# Patient Record
Sex: FEMALE | Race: WHITE | NOT HISPANIC OR LATINO | Employment: FULL TIME | URBAN - NONMETROPOLITAN AREA
[De-identification: names, ages, dates, MRNs, and addresses within clinical notes are randomized per-mention and may not be internally consistent; named-entity substitution may affect disease eponyms.]

---

## 2017-03-20 ENCOUNTER — PROCEDURE VISIT (OUTPATIENT)
Dept: OBSTETRICS AND GYNECOLOGY | Facility: CLINIC | Age: 37
End: 2017-03-20

## 2017-03-20 ENCOUNTER — LAB (OUTPATIENT)
Dept: LAB | Facility: HOSPITAL | Age: 37
End: 2017-03-20

## 2017-03-20 VITALS
HEIGHT: 71 IN | DIASTOLIC BLOOD PRESSURE: 88 MMHG | BODY MASS INDEX: 40.6 KG/M2 | SYSTOLIC BLOOD PRESSURE: 120 MMHG | WEIGHT: 290 LBS

## 2017-03-20 DIAGNOSIS — E66.01 OBESITY, CLASS III, BMI 40-49.9 (MORBID OBESITY) (HCC): ICD-10-CM

## 2017-03-20 DIAGNOSIS — E88.81 INSULIN RESISTANCE: ICD-10-CM

## 2017-03-20 DIAGNOSIS — Z01.419 VISIT FOR GYNECOLOGIC EXAMINATION: Primary | ICD-10-CM

## 2017-03-20 DIAGNOSIS — R32 URINARY INCONTINENCE, UNSPECIFIED TYPE: ICD-10-CM

## 2017-03-20 PROBLEM — E88.819 INSULIN RESISTANCE: Status: ACTIVE | Noted: 2017-03-20

## 2017-03-20 LAB — TSH SERPL DL<=0.05 MIU/L-ACNC: 3.14 MIU/ML (ref 0.46–4.68)

## 2017-03-20 PROCEDURE — 99395 PREV VISIT EST AGE 18-39: CPT | Performed by: OBSTETRICS & GYNECOLOGY

## 2017-03-20 PROCEDURE — 87086 URINE CULTURE/COLONY COUNT: CPT | Performed by: OBSTETRICS & GYNECOLOGY

## 2017-03-20 PROCEDURE — 84443 ASSAY THYROID STIM HORMONE: CPT | Performed by: OBSTETRICS & GYNECOLOGY

## 2017-03-20 PROCEDURE — 36415 COLL VENOUS BLD VENIPUNCTURE: CPT

## 2017-03-20 NOTE — PROGRESS NOTES
Subjective   Janell Suazo is a 37 y.o. female.     HPI Comments: Patient presents today for annual gynecologic exam  Last Pap smear was in October 2015 and was normal.  Patient reports some urinary incontinence, most often noted when bending/changing position.  Patient also reports difficulty with sleeping.  Patient reports some nodularity in her underarm area especially on the right.  We discussed weight loss and some potential benefits in regards to sleeping and urinary concerns.  We discussed some potential dietary modification including avoiding problematic foods and portion size.  We discussed exercise and its importance for weight loss.  Patient was notified by another provider that she has insulin resistance for which she was started on metformin.  Patient stopped taking metformin because this caused GI upset and diarrhea.      Gynecologic Exam   The patient's primary symptoms include pelvic pain (occasional intermittent). Associated symptoms include urgency (sometimes loses a little urine). Pertinent negatives include no constipation.       The following portions of the patient's history were reviewed and updated as appropriate: allergies, current medications, past family history, past medical history, past social history, past surgical history and problem list.      Review of Systems   Constitutional: Negative for activity change, appetite change and unexpected weight change.   HENT: Negative for congestion and dental problem.    Eyes: Negative for discharge and itching.   Respiratory: Negative for shortness of breath and wheezing.    Cardiovascular: Negative for chest pain and palpitations.   Gastrointestinal: Negative for abdominal distention and constipation.   Endocrine: Negative for cold intolerance and heat intolerance.   Genitourinary: Positive for pelvic pain (occasional intermittent) and urgency (sometimes loses a little urine). Negative for menstrual problem.   Musculoskeletal: Negative for  myalgias and neck pain.   Allergic/Immunologic: Negative for environmental allergies and food allergies.   Neurological: Negative for dizziness and facial asymmetry.   Hematological: Negative for adenopathy. Does not bruise/bleed easily.   Psychiatric/Behavioral: Negative for agitation and behavioral problems.   All other systems reviewed and are negative.      Objective   Physical Exam   Constitutional: She is oriented to person, place, and time. She appears well-developed and well-nourished. No distress.   HENT:   Head: Normocephalic and atraumatic.   Right Ear: External ear normal.   Left Ear: External ear normal.   Nose: Nose normal.   Mouth/Throat: Oropharynx is clear and moist. No oropharyngeal exudate.   Eyes: Conjunctivae and EOM are normal. Pupils are equal, round, and reactive to light. Right eye exhibits no discharge. Left eye exhibits no discharge. No scleral icterus.   Neck: Normal range of motion. Neck supple. No tracheal deviation present. No thyromegaly present.   Cardiovascular: Normal rate, regular rhythm, normal heart sounds and intact distal pulses.  Exam reveals no gallop and no friction rub.    No murmur heard.  Pulmonary/Chest: Effort normal and breath sounds normal. No respiratory distress. She has no wheezes. She has no rales. She exhibits no mass, no tenderness, no laceration, no deformity and no retraction. Right breast exhibits no inverted nipple, no mass, no nipple discharge, no skin change and no tenderness. Left breast exhibits no inverted nipple, no mass, no nipple discharge, no skin change and no tenderness. Breasts are symmetrical. There is no breast swelling.   Abdominal: Soft. She exhibits no distension and no mass. There is no tenderness. There is no rebound and no guarding. No hernia.   Genitourinary: Vagina normal and uterus normal. No breast tenderness, discharge or bleeding. Pelvic exam was performed with patient supine. No labial fusion. There is no rash, tenderness, lesion  or injury on the right labia. There is no rash, tenderness, lesion or injury on the left labia. Uterus is not deviated, not enlarged, not fixed and not tender. Cervix exhibits no motion tenderness, no discharge and no friability. Right adnexum displays no mass, no tenderness and no fullness. Left adnexum displays no mass, no tenderness and no fullness. No erythema, tenderness or bleeding in the vagina. No foreign body in the vagina. No signs of injury around the vagina. No vaginal discharge found.   Musculoskeletal: Normal range of motion. She exhibits no edema or deformity.   Neurological: She is alert and oriented to person, place, and time. No cranial nerve deficit. Coordination normal.   Skin: Skin is warm and dry. No rash noted. She is not diaphoretic. No erythema. No pallor.   Psychiatric: She has a normal mood and affect. Her behavior is normal. Judgment and thought content normal.   Vitals reviewed.      Assessment/Plan   Janell was seen today for gynecologic exam.    Diagnoses and all orders for this visit:    Visit for gynecologic examination  -     Urine Culture    Insulin resistance  -     Hemoglobin A1c; Future    Obesity, Class III, BMI 40-49.9 (morbid obesity)  -     TSH; Future    Urinary incontinence, unspecified type  -     Ambulatory Referral to Physical Therapy Evaluate and treat (Loss of urine sometimes with position changes.)       Weight loss  Dietary modification  Check TSH, A1C  PT for urinary incontinence and check UCx

## 2017-03-21 ENCOUNTER — TRANSCRIBE ORDERS (OUTPATIENT)
Dept: PHYSICAL THERAPY | Facility: CLINIC | Age: 37
End: 2017-03-21

## 2017-03-21 DIAGNOSIS — N39.498 OTHER URINARY INCONTINENCE: Primary | ICD-10-CM

## 2017-03-21 LAB — BACTERIA SPEC AEROBE CULT: NORMAL

## 2017-03-24 ENCOUNTER — APPOINTMENT (OUTPATIENT)
Dept: LAB | Facility: HOSPITAL | Age: 37
End: 2017-03-24

## 2017-03-24 ENCOUNTER — TELEPHONE (OUTPATIENT)
Dept: OBSTETRICS AND GYNECOLOGY | Facility: CLINIC | Age: 37
End: 2017-03-24

## 2017-03-24 LAB — HBA1C MFR BLD: 5.55 % (ref 4–5.6)

## 2017-03-24 PROCEDURE — 36415 COLL VENOUS BLD VENIPUNCTURE: CPT

## 2017-03-24 PROCEDURE — 83036 HEMOGLOBIN GLYCOSYLATED A1C: CPT | Performed by: OBSTETRICS & GYNECOLOGY

## 2017-03-24 NOTE — TELEPHONE ENCOUNTER
----- Message from JAN Holt sent at 3/24/2017  8:52 AM CDT -----  No UTI    I called this patient with these results.  She ask about her other results.  I talked to corrine about her A1C.  He is calling her about this.

## 2017-09-12 ENCOUNTER — TELEPHONE (OUTPATIENT)
Dept: FAMILY MEDICINE CLINIC | Facility: CLINIC | Age: 37
End: 2017-09-12

## 2017-09-12 NOTE — TELEPHONE ENCOUNTER
PT CALLED STATING SAW IN URGENT CARE, THINKS UTI IS BACK WOULD LIKE TO SPEAK WITH YOU. PT IS NOT ESTABLISHED    Needs to be seen for this. Thanks

## 2018-02-27 ENCOUNTER — OFFICE VISIT (OUTPATIENT)
Dept: INTERNAL MEDICINE | Facility: CLINIC | Age: 38
End: 2018-02-27

## 2018-02-27 VITALS
DIASTOLIC BLOOD PRESSURE: 80 MMHG | WEIGHT: 285.8 LBS | BODY MASS INDEX: 40.01 KG/M2 | HEIGHT: 71 IN | SYSTOLIC BLOOD PRESSURE: 130 MMHG

## 2018-02-27 DIAGNOSIS — J01.00 ACUTE MAXILLARY SINUSITIS, RECURRENCE NOT SPECIFIED: Primary | ICD-10-CM

## 2018-02-27 DIAGNOSIS — E88.81 INSULIN RESISTANCE: ICD-10-CM

## 2018-02-27 DIAGNOSIS — E66.9 OBESITY, CLASS II, BMI 35-39.9: ICD-10-CM

## 2018-02-27 DIAGNOSIS — G89.29 CHRONIC MIDLINE LOW BACK PAIN WITH RIGHT-SIDED SCIATICA: ICD-10-CM

## 2018-02-27 DIAGNOSIS — M54.41 CHRONIC MIDLINE LOW BACK PAIN WITH RIGHT-SIDED SCIATICA: ICD-10-CM

## 2018-02-27 PROCEDURE — 99204 OFFICE O/P NEW MOD 45 MIN: CPT | Performed by: INTERNAL MEDICINE

## 2018-02-27 RX ORDER — AMOXICILLIN AND CLAVULANATE POTASSIUM 875; 125 MG/1; MG/1
1 TABLET, FILM COATED ORAL EVERY 12 HOURS SCHEDULED
Qty: 14 TABLET | Refills: 0 | Status: SHIPPED | OUTPATIENT
Start: 2018-02-27 | End: 2018-03-09

## 2018-02-27 RX ORDER — FLUTICASONE PROPIONATE 50 MCG
2 SPRAY, SUSPENSION (ML) NASAL DAILY
Qty: 1 BOTTLE | Refills: 0 | Status: SHIPPED | OUTPATIENT
Start: 2018-02-27 | End: 2018-05-01

## 2018-02-27 RX ORDER — ALBUTEROL SULFATE 90 UG/1
2 AEROSOL, METERED RESPIRATORY (INHALATION) EVERY 4 HOURS PRN
Qty: 1 INHALER | Refills: 0 | Status: SHIPPED | OUTPATIENT
Start: 2018-02-27 | End: 2018-03-09

## 2018-02-27 NOTE — PROGRESS NOTES
Subjective   Janell Suazo is a 38 y.o. female with PMH of Insulin resistance, back pain who comes to establish care.    Patient today complains of sinus congestion, nasal congestion, earache and cough.Cough is productive of yellowish sputum and is worse at night. She has been sick for several weeks.    Patient reports that she was diagnosed with Insulin resistance couple of years ago. She was briefly on Metformin but stopped due to diarrhea.  Has been having difficulty losing weight.    She also complains of recurrent episodes of low back pain. Pain is located in lower lumbar area and radiates down her right leg. She fell few years ago and hurt her back and has been having problems with her back on and off since then.  X-Ray of L spine showed degenerative disc changes.        Past Medical History:   Diagnosis Date   • Acute otitis media with effusion    • Allergic rhinitis    • Amenorrhea    • Anxiety state    • Diarrhea    • Encounter for gynecological examination    • Encounter for gynecological examination without abnormal finding    • Epigastric pain     Burning epigastric pain    • Fatigue    • Gastroesophageal reflux disease    • Hip pain    • Knee pain    • Laboratory examination ordered as part of a routine general medical examination in pediatric patient     General examination of patient    • Low back pain    • Lumbosacral strain    • Metabolic syndrome X    • Morbid obesity     BMI 40.4    • Nausea and vomiting    • Obesity     Hyperinsulinar obesity    • Otitis media    • Pain in limb     right limb   • Rhinitis      Past Surgical History:   Procedure Laterality Date   • DIAGNOSTIC LAPAROSCOPY  06/21/2007    Exam under anesthesia, diagnostic laparoscopy, diagnostic hysteroscopy, fractional dilatation and curetttage   • INJECTION OF MEDICATION  11/05/2012    Kenalog (1)  -  SILVESTRE Valdes   • PAP SMEAR  08/08/2011    Negative   • SALPINGO OOPHORECTOMY  2004    Left   [facility]   • TUBAL ABDOMINAL  LIGATION  2010    [place], [place]       Social History   Substance Use Topics   • Smoking status: Never Smoker   • Smokeless tobacco: Never Used   • Alcohol use No     Family History   Problem Relation Age of Onset   • Colon cancer Other      Colorectal cancer   • Diabetes Other    • Hypertension Other    • Thyroid disease Other      No Known Allergies  Current Outpatient Prescriptions on File Prior to Visit   Medication Sig Dispense Refill   • [DISCONTINUED] cefuroxime (CEFTIN) 500 MG tablet Take 1 tablet by mouth 2 (Two) Times a Day. 20 tablet 0   • [DISCONTINUED] cyclobenzaprine (FLEXERIL) 10 MG tablet Take 10 mg by mouth 3 (Three) Times a Day As Needed for Muscle Spasms.     • [DISCONTINUED] cyclobenzaprine (FLEXERIL) 5 MG tablet Take 1 tablet by mouth 3 (Three) Times a Day As Needed for Muscle Spasms. 20 tablet 0   • [DISCONTINUED] diclofenac (VOLTAREN) 50 MG EC tablet Take 1 tablet by mouth 3 (Three) Times a Day. 30 tablet 0   • [DISCONTINUED] levoFLOXacin (LEVAQUIN) 750 MG tablet Take 1 tablet by mouth Daily. 7 tablet 0   • [DISCONTINUED] naproxen (EC NAPROSYN) 500 MG EC tablet Take 1 tablet by mouth 2 (Two) Times a Day As Needed for Mild Pain . 30 tablet 0   • [DISCONTINUED] promethazine-dextromethorphan (PROMETHAZINE-DM) 6.25-15 MG/5ML syrup Take 5 mL by mouth 4 (Four) Times a Day As Needed for Cough. 118 mL 0     No current facility-administered medications on file prior to visit.      Review of Systems   Constitutional: Positive for chills. Negative for activity change.   HENT: Positive for congestion, ear pain, rhinorrhea, sinus pain and sinus pressure.    Respiratory: Positive for cough and wheezing.    Cardiovascular: Negative for chest pain, palpitations and leg swelling.   Endocrine: Negative for cold intolerance, heat intolerance, polydipsia, polyphagia and polyuria.   Musculoskeletal: Positive for back pain. Negative for gait problem and neck pain.   Neurological: Negative for dizziness, syncope and  light-headedness.   Psychiatric/Behavioral: Negative for sleep disturbance. The patient is not nervous/anxious.        Objective   Physical Exam   Constitutional: She is oriented to person, place, and time. She appears well-developed and well-nourished.   HENT:   Head: Normocephalic and atraumatic.   Right Ear: Tympanic membrane normal.   Left Ear: A middle ear effusion is present.   Nose: Mucosal edema present. Right sinus exhibits maxillary sinus tenderness. Left sinus exhibits maxillary sinus tenderness.   Mouth/Throat: Posterior oropharyngeal erythema present.   Eyes: Conjunctivae and EOM are normal. Pupils are equal, round, and reactive to light.   Neck: No JVD present. No thyromegaly present.   Cardiovascular: Normal rate and regular rhythm.    Pulmonary/Chest: Effort normal. She has wheezes.   Abdominal: Soft. Bowel sounds are normal.   Musculoskeletal: Normal range of motion. She exhibits no deformity.        Lumbar back: She exhibits tenderness. She exhibits no swelling and no deformity.   Neurological: She is alert and oriented to person, place, and time. She has normal reflexes.   Nursing note and vitals reviewed.          Patient Active Problem List   Diagnosis   • Insulin resistance   • Obesity, Class III, BMI 40-49.9 (morbid obesity)               Assessment/Plan   Janell was seen today for establish care, uri, cough, earache and sinusitis.    Diagnoses and all orders for this visit:    Acute maxillary sinusitis, recurrence not specified    Insulin resistance  -     Hemoglobin A1c  -     Insulin, Total  -     Comprehensive Metabolic Panel  -     Ambulatory Referral to Diabetic Education    Obesity, Class II, BMI 35-39.9    Chronic midline low back pain with right-sided sciatica  -     Ambulatory Referral to Physical Therapy Evaluate and treat    Other orders  -     amoxicillin-clavulanate (AUGMENTIN) 875-125 MG per tablet; Take 1 tablet by mouth Every 12 (Twelve) Hours.  -     fluticasone (FLONASE)  50 MCG/ACT nasal spray; 2 sprays into each nostril Daily.  -     albuterol (PROVENTIL HFA;VENTOLIN HFA) 108 (90 Base) MCG/ACT inhaler; Inhale 2 puffs Every 4 (Four) Hours As Needed for Wheezing.  -     metFORMIN (GLUCOPHAGE) 500 MG tablet; Take 1 tablet by mouth 2 (Two) Times a Day With Meals.             Plan      1. Augmentin 875 mg bid for 1 week.      Flonase nasal spray 2 puffs daily.      Zyrtec OTC 10 mg daily.      Albuterol inhaler 2 puffs qid PRN.      Robitussin DM PRN cough.  2. Will recheck HgbA1c, Insulin level and lipids.      Calories restricted diet and weight loss.      Dietary modifications.      Will try Metformin 500 mg daily and advance dose as tolerated.      If GI side effects again, will change to Metformin XR.  3. Referral to PT.      Naproxen 500 mg bid on PRN basis.             Follow up in 1 month                    This document has been electronically signed by Sumi Trevizo MD on February 27, 2018 10:56 PM

## 2018-03-05 DIAGNOSIS — H92.02 EARACHE, LEFT: Primary | ICD-10-CM

## 2018-03-06 ENCOUNTER — TELEPHONE (OUTPATIENT)
Dept: INTERNAL MEDICINE | Facility: CLINIC | Age: 38
End: 2018-03-06

## 2018-03-09 ENCOUNTER — TELEPHONE (OUTPATIENT)
Dept: INTERNAL MEDICINE | Facility: CLINIC | Age: 38
End: 2018-03-09

## 2018-03-09 ENCOUNTER — OFFICE VISIT (OUTPATIENT)
Dept: OTOLARYNGOLOGY | Facility: CLINIC | Age: 38
End: 2018-03-09

## 2018-03-09 ENCOUNTER — CLINICAL SUPPORT (OUTPATIENT)
Dept: AUDIOLOGY | Facility: CLINIC | Age: 38
End: 2018-03-09

## 2018-03-09 VITALS — TEMPERATURE: 98.1 F | WEIGHT: 284 LBS | HEIGHT: 71 IN | BODY MASS INDEX: 39.76 KG/M2

## 2018-03-09 DIAGNOSIS — H93.12 TINNITUS OF LEFT EAR: ICD-10-CM

## 2018-03-09 DIAGNOSIS — H69.82 DYSFUNCTION OF LEFT EUSTACHIAN TUBE: ICD-10-CM

## 2018-03-09 DIAGNOSIS — J32.8 OTHER CHRONIC SINUSITIS: Primary | ICD-10-CM

## 2018-03-09 DIAGNOSIS — H90.12 CONDUCTIVE HEARING LOSS OF LEFT EAR WITH UNRESTRICTED HEARING OF RIGHT EAR: Primary | ICD-10-CM

## 2018-03-09 DIAGNOSIS — H90.72 MIXED CONDUCTIVE AND SENSORINEURAL HEARING LOSS OF LEFT EAR WITH UNRESTRICTED HEARING OF RIGHT EAR: ICD-10-CM

## 2018-03-09 PROCEDURE — 99204 OFFICE O/P NEW MOD 45 MIN: CPT | Performed by: OTOLARYNGOLOGY

## 2018-03-09 PROCEDURE — 92567 TYMPANOMETRY: CPT | Performed by: AUDIOLOGIST

## 2018-03-09 PROCEDURE — 92557 COMPREHENSIVE HEARING TEST: CPT | Performed by: AUDIOLOGIST

## 2018-03-09 RX ORDER — PREDNISONE 10 MG/1
TABLET ORAL
Qty: 30 TABLET | Refills: 0 | Status: SHIPPED | OUTPATIENT
Start: 2018-03-09 | End: 2018-04-06

## 2018-03-09 RX ORDER — CEFDINIR 300 MG/1
300 CAPSULE ORAL 2 TIMES DAILY
Qty: 28 CAPSULE | Refills: 0 | Status: SHIPPED | OUTPATIENT
Start: 2018-03-09 | End: 2018-03-20

## 2018-03-09 NOTE — TELEPHONE ENCOUNTER
Spoke with pt let her know she has an appt on 3/9/2018 at 3:15 at the [facility] and she needs to keep this appt .....

## 2018-03-09 NOTE — PROGRESS NOTES
Subjective   Janell Suazo is a 38 y.o. female.   CC: tinnitus and ear discomfort  History of Present Illness    Patient comes in with several weeks of discomfort with tinnitus which is sometimes pulsatile in her left ear to bothers her thinks are for sleep at night hard to hear whether for same.  She's had associated congestion rhinitis and sinusitis.  She is on some Augmentin Flonase 1 today but is not cleared.  She's not any fever chills.  She's had long-standing severe allergies to multiple findings.  She is drainage with rhinitis and postnasal drip and congestion    Says she's not a diabetic and has a metabolic syndrome.  The following portions of the patient's history were reviewed and updated as appropriate: allergies, current medications, past family history, past medical history, past social history, past surgical history and problem list.      Janell Suazo reports that she has never smoked. She has never used smokeless tobacco. She reports that she does not drink alcohol or use illicit drugs.  Patient is not a tobacco user and has not been counseled for use of tobacco products    Family History   Problem Relation Age of Onset   • Colon cancer Other      Colorectal cancer   • Diabetes Other    • Hypertension Other    • Thyroid disease Other          Current Outpatient Prescriptions:   •  fluticasone (FLONASE) 50 MCG/ACT nasal spray, 2 sprays into each nostril Daily., Disp: 1 bottle, Rfl: 0  •  metFORMIN (GLUCOPHAGE) 500 MG tablet, Take 1 tablet by mouth 2 (Two) Times a Day With Meals., Disp: 60 tablet, Rfl: 2  •  cefdinir (OMNICEF) 300 MG capsule, Take 1 capsule by mouth 2 (Two) Times a Day., Disp: 28 capsule, Rfl: 0  •  predniSONE (DELTASONE) 10 MG tablet, Daily dose: 5 tabs for 2 days, then 4 tabs for 2 days, then 3 tabs for 2 days, then 2 tabs for 2 days, then 1 tab for 2 days, Disp: 30 tablet, Rfl: 0    No Known Allergies    Past Medical History:   Diagnosis Date   • Acute otitis media with  effusion    • Allergic rhinitis    • Amenorrhea    • Anxiety state    • Diarrhea    • Encounter for gynecological examination    • Encounter for gynecological examination without abnormal finding    • Epigastric pain     Burning epigastric pain    • Fatigue    • Gastroesophageal reflux disease    • Hip pain    • Knee pain    • Laboratory examination ordered as part of a routine general medical examination in pediatric patient     General examination of patient    • Low back pain    • Lumbosacral strain    • Metabolic syndrome X    • Morbid obesity     BMI 40.4    • Nausea and vomiting    • Obesity     Hyperinsulinar obesity    • Otitis media    • Pain in limb     right limb   • Rhinitis          Review of Systems   Constitutional: Negative for fever.   HENT: Positive for congestion, hearing loss, sinus pressure and tinnitus. Negative for facial swelling and nosebleeds.    Hematological: Negative for adenopathy.   All other systems reviewed and are negative.          Objective   Physical Exam   Constitutional: She is oriented to person, place, and time. She appears well-developed and well-nourished.   HENT:   Head: Normocephalic and atraumatic.   Right Ear: Hearing, tympanic membrane, external ear and ear canal normal. No mastoid tenderness.   Left Ear: External ear and ear canal normal. No mastoid tenderness. Tympanic membrane is not injected, not scarred and not erythematous. A middle ear effusion is present.   Nose: Mucosal edema, rhinorrhea, nasal deformity and septal deviation present. No epistaxis. Right sinus exhibits no maxillary sinus tenderness and no frontal sinus tenderness. Left sinus exhibits no maxillary sinus tenderness and no frontal sinus tenderness.   Mouth/Throat: Uvula is midline, oropharynx is clear and moist and mucous membranes are normal. No trismus in the jaw. Normal dentition. No oropharyngeal exudate or posterior oropharyngeal edema. No tonsillar exudate.   Eyes: Conjunctivae and EOM are  normal.   Neck: Normal range of motion. Neck supple. No JVD present. No tracheal deviation present. No thyromegaly present.   Cardiovascular: Normal rate and regular rhythm.    Pulmonary/Chest: Effort normal.   Musculoskeletal: Normal range of motion.   Lymphadenopathy:        Head (right side): No submental, no submandibular, no tonsillar, no preauricular, no posterior auricular and no occipital adenopathy present.        Head (left side): No submental, no submandibular, no tonsillar, no preauricular, no posterior auricular and no occipital adenopathy present.     She has no cervical adenopathy.        Right cervical: No superficial cervical, no deep cervical and no posterior cervical adenopathy present.       Left cervical: No superficial cervical, no deep cervical and no posterior cervical adenopathy present.   Neurological: She is alert and oriented to person, place, and time. No cranial nerve deficit.   Skin: Skin is warm.   Psychiatric: She has a normal mood and affect. Her speech is normal and behavior is normal. Thought content normal.   Nursing note and vitals reviewed.        The audiogram and tympanogram reviewed the patient actual findings discussed showing mixed hearing loss as early conductive component and flat tympanogram the left    Assessment/Plan   Janell was seen today for earache.    Diagnoses and all orders for this visit:    Other chronic sinusitis    Tinnitus of left ear    Dysfunction of left eustachian tube    Mixed conductive and sensorineural hearing loss of left ear with unrestricted hearing of right ear    Other orders  -     predniSONE (DELTASONE) 10 MG tablet; Daily dose: 5 tabs for 2 days, then 4 tabs for 2 days, then 3 tabs for 2 days, then 2 tabs for 2 days, then 1 tab for 2 days  -     cefdinir (OMNICEF) 300 MG capsule; Take 1 capsule by mouth 2 (Two) Times a Day.        Discussed options of medical therapy.    The above medications or risks side effects and usage to minimize  side effects .  discussed possibly myringotomy nasopharyngoscopy CT scan sinuses temporal bone based on her findings.   I think is reasonable treat her conservatively the am not sure all of her hearing loss is just from the fluid repeat hearing testing on follow-up consider other options Scearce season detail and she is agreement this.   will call me changes or problems or questions or any difficulty with medications

## 2018-03-12 NOTE — PROGRESS NOTES
"STANDARD AUDIOMETRIC EVALUATION      Name:  Janell Suazo  :  1980  Age:  38 y.o.  Date of Evaluation: 3/9/2018    HISTORY    Reason for visit:  Janell Suazo is seen today for a hearing evaluation at the request of Dr. Dex Cunha.  Patient reports tinnitus and a \"whooshing\" sound in her left ear. She reports a dull ache in her left ear.  She reports that her ears feel like they are under water.  She reports having a left ear infection and has taken 2 sets of antibiotics.      EVALUATION    See Audiogram    RESULTS        Otoscopy and Tympanometry 226 Hz :  Right Ear:  Otoscopy:  Clear ear canal          Tympanometry:  Middle ear function within normal limits    Left Ear:   Otoscopy:  Clear ear canal        Tympanometry:  Reduced pressure and compliance consistent with outer/middle ear involvement    Test technique:  Standard Audiometry     Pure Tone Audiometry:   Patient responded to pure tones at 10-25 dB for 250-8000 Hz in right ear, and at 20-70 dB for 250-8000 Hz in left ear.       Speech Audiometry:        Right Ear:  Speech Reception Threshold (SRT) was obtained at 10 dBHL                 Speech Discrimination scores were 100% in quiet when words were presented at 50 dBHL       Left Ear:  Speech Reception Threshold (SRT) was obtained at 25 dBHL                 Speech Discrimination scores were 96% in masking noise when words were presented at  60 dBHL    Reliability:   good    IMPRESSIONS:  1.  Tympanometry results are consistent with Middle ear function within normal limits in right ear, and Reduced pressure and compliance consistent with outer/middle ear involvement in left ear.  2.  Pure tone results are consistent with hearing sensitivity within normal limits for right ear, and within normal limits to moderately-severe reverse cookie bite conductive hearing loss in left ear.       RECOMMENDATIONS:  Patient is seeing the Ear Nose and Throat physician immediately following this " examination.  It was a pleasure seeing Janell Suazo in Audiology today.  We would be happy to do further testing or discuss these test as necessary.          This document has been electronically signed by CARMINA Freedman on March 12, 2018 8:02 AM          CARMINA Freedman  Licensed Audiologist

## 2018-03-13 ENCOUNTER — TELEPHONE (OUTPATIENT)
Dept: SURGERY | Facility: CLINIC | Age: 38
End: 2018-03-13

## 2018-03-13 NOTE — TELEPHONE ENCOUNTER
Patient states the medicine is not helping and she is unsure if it is her body causing the bleeding or if its the medicine.  Advised he that Dr. Cunha is in surgery and if someone does not contact her later this evening we will call her in the morning.  She understood.

## 2018-03-13 NOTE — TELEPHONE ENCOUNTER
Per Dr. Cunha, patient was notified to stop taking the prednisone and continue the antibiotic, also she would need to see a GYN for the bleeding.  She stated she has an appointment on Monday 3/19/18 with her GYN doctor.

## 2018-03-13 NOTE — TELEPHONE ENCOUNTER
----- Message from Rupal Spear sent at 3/13/2018 11:23 AM CDT -----  Contact: [phone]  HAVING PROBLEMS WITH MEDICATION. HAVING VAGINAL BLEEDING AND DISCHARGER. THINKS IT IS FROM THE MEDICATION CEFDINIR 300MG PREDNISONE 10MG. EAR IS NOT FEELING BETTER.

## 2018-03-13 NOTE — TELEPHONE ENCOUNTER
----- Message from Rupal Spear sent at 3/13/2018 11:23 AM CDT -----  Contact: [phone]  HAVING PROBLEMS WITH MEDICATION. HAVING VAGINAL BLEEDING AND DISCHARGER. THINKS IT IS FROM THE MEDICATION CEFDINIR 300MG PREDNISONE 10MG. EAR IS NOT FEELING BETTER.

## 2018-03-15 ENCOUNTER — TELEPHONE (OUTPATIENT)
Dept: OBSTETRICS AND GYNECOLOGY | Facility: CLINIC | Age: 38
End: 2018-03-15

## 2018-03-15 NOTE — TELEPHONE ENCOUNTER
----- Message from Nisreen Liu CNA sent at 3/15/2018 12:58 PM CDT -----  This patient has called multiple times this week. She is complaining of pelvic pain. She has canceled multiple appointments and now is demanding that she needs to be seen ASAP for pelvic pain. She currently has an appointment for the 26th with karlene. She wants to be seen before then. I told her if it was that bad that maybe she should go to the ER and she states she cant afford it. I told her I didn't see anything soon to get her in with [facility]. IF you see a good spot, or want me to work her in somewhere please let me know .  Thanks     I called this patient and informed her that we gave her the appointment on 3-26-18 with Dr. Billings.  That is as soon as we can get her in.  She has had several appointments in this department and she didn't show up for them.  I also told her that if she was hurting too bad or bleeding heavy that she needs to go to the ER.

## 2018-03-20 ENCOUNTER — OFFICE VISIT (OUTPATIENT)
Dept: OTOLARYNGOLOGY | Facility: CLINIC | Age: 38
End: 2018-03-20

## 2018-03-20 ENCOUNTER — CLINICAL SUPPORT (OUTPATIENT)
Dept: AUDIOLOGY | Facility: CLINIC | Age: 38
End: 2018-03-20

## 2018-03-20 VITALS — TEMPERATURE: 97.7 F | HEIGHT: 71 IN | WEIGHT: 284 LBS | BODY MASS INDEX: 39.76 KG/M2

## 2018-03-20 DIAGNOSIS — H93.12 TINNITUS OF LEFT EAR: Primary | ICD-10-CM

## 2018-03-20 DIAGNOSIS — H90.12 CONDUCTIVE HEARING LOSS OF LEFT EAR WITH UNRESTRICTED HEARING OF RIGHT EAR: Primary | ICD-10-CM

## 2018-03-20 DIAGNOSIS — H69.82 DYSFUNCTION OF LEFT EUSTACHIAN TUBE: ICD-10-CM

## 2018-03-20 DIAGNOSIS — H90.72 MIXED CONDUCTIVE AND SENSORINEURAL HEARING LOSS OF LEFT EAR WITH UNRESTRICTED HEARING OF RIGHT EAR: ICD-10-CM

## 2018-03-20 PROCEDURE — 92557 COMPREHENSIVE HEARING TEST: CPT | Performed by: AUDIOLOGIST

## 2018-03-20 PROCEDURE — 92567 TYMPANOMETRY: CPT | Performed by: AUDIOLOGIST

## 2018-03-20 PROCEDURE — 69420 INCISION OF EARDRUM: CPT | Performed by: OTOLARYNGOLOGY

## 2018-03-20 PROCEDURE — 99213 OFFICE O/P EST LOW 20 MIN: CPT | Performed by: OTOLARYNGOLOGY

## 2018-03-20 NOTE — PROGRESS NOTES
Subjective   Janell Suazo is a 38 y.o. female.   CC:Patientwith persistent hearing loss    History of Present Illness   Patient says that hearing loss and tinnitus is very bothersome makes her heart for sleep is not having a lot of pain in her ear just congestion she was unable take the antibiotics and all the steroids because some other issues.  She was told by her GYN not to do that.  She currently denies any fever no drainage out of her ear but has hearing loss and tinnitus particularly in left ear [email or web address] a work      The following portions of the patient's history were reviewed and updated as appropriate: allergies, current medications, past family history, past medical history, past social history, past surgical history and problem list.      Current Outpatient Prescriptions:   •  fluticasone (FLONASE) 50 MCG/ACT nasal spray, 2 sprays into each nostril Daily., Disp: 1 bottle, Rfl: 0  •  metFORMIN (GLUCOPHAGE) 500 MG tablet, Take 1 tablet by mouth 2 (Two) Times a Day With Meals., Disp: 60 tablet, Rfl: 2  •  predniSONE (DELTASONE) 10 MG tablet, Daily dose: 5 tabs for 2 days, then 4 tabs for 2 days, then 3 tabs for 2 days, then 2 tabs for 2 days, then 1 tab for 2 days, Disp: 30 tablet, Rfl: 0    No Known Allergies          Review of Systems   Constitutional: Negative for fever.   HENT: Positive for congestion, hearing loss, postnasal drip, sinus pressure and tinnitus. Negative for nosebleeds.    Hematological: Negative for adenopathy.   All other systems reviewed and are negative.          Objective   Physical Exam   Constitutional: She is oriented to person, place, and time. She appears well-developed and well-nourished.   HENT:   Head: Normocephalic and atraumatic.   Right Ear: Hearing, tympanic membrane, external ear and ear canal normal. No mastoid tenderness.   Left Ear: External ear and ear canal normal. No mastoid tenderness. Tympanic membrane is not injected, not scarred and not erythematous.  A middle ear effusion is present.   Nose: Mucosal edema, rhinorrhea, nasal deformity and septal deviation present. No epistaxis. Right sinus exhibits no maxillary sinus tenderness and no frontal sinus tenderness. Left sinus exhibits no maxillary sinus tenderness and no frontal sinus tenderness.   Mouth/Throat: Uvula is midline, oropharynx is clear and moist and mucous membranes are normal. No trismus in the jaw. Normal dentition. No oropharyngeal exudate or posterior oropharyngeal edema. No tonsillar exudate.   Eyes: Conjunctivae and EOM are normal.   Neck: Normal range of motion. Neck supple. No JVD present. No tracheal deviation present. No thyromegaly present.   Cardiovascular: Normal rate and regular rhythm.    Pulmonary/Chest: Effort normal.   Musculoskeletal: Normal range of motion.   Lymphadenopathy:        Head (right side): No submental, no submandibular, no tonsillar, no preauricular, no posterior auricular and no occipital adenopathy present.        Head (left side): No submental, no submandibular, no tonsillar, no preauricular, no posterior auricular and no occipital adenopathy present.     She has no cervical adenopathy.        Right cervical: No superficial cervical, no deep cervical and no posterior cervical adenopathy present.       Left cervical: No superficial cervical, no deep cervical and no posterior cervical adenopathy present.   Neurological: She is alert and oriented to person, place, and time. No cranial nerve deficit.   Skin: Skin is warm.   Psychiatric: She has a normal mood and affect. Her speech is normal and behavior is normal. Thought content normal.   Nursing note and vitals reviewed.  The audiogram was reviewed with the patient showing worsening of her conductive hearing loss with a mixed component flat tympanogram on the left    Procedure note: Left myringotomy was done after getting consent after discussed the patient's options.  Particular since she could not tolerate the  medications she was desperate have suggested she consider myringotomy she understood that couldn't not heal she could need other surgery including PE tube placement.  We also discussed on a different day doing and nasopharyngoscopy and she was too uncomfortable to do that today.  After getting consent left ear was anesthetized with topical EMLA cream and small amount of phenol anterior inferior radial incision was made.  Dissection symptoms medically improved.    Assessment/Plan   Janell was seen today for tinnitus and sinus problem.    Diagnoses and all orders for this visit:    Tinnitus of left ear    Dysfunction of left eustachian tube    Mixed conductive and sensorineural hearing loss of left ear with unrestricted hearing of right ear        Is to keep the ear dry until also drainage persists more than 3 or 4 days on the left ear.  We'll see her back in 2 weeks tip is healing we may need to consider PE tube also consider nasopharyngoscopy all questions were answered she is happy that she is doing better and she is to call for questions or problems

## 2018-03-21 NOTE — PROGRESS NOTES
STANDARD AUDIOMETRIC EVALUATION      Name:  Janell Suazo  :  1980  Age:  38 y.o.  Date of Evaluation: 3/20/2018    HISTORY    Reason for visit:  Janell Suazo is seen today for a hearing evaluation at the request of Dr. Dex Cunha.  Patient reports that she is unsure as to how she is hearing.  She reports having left sided tinnitus and dull achy pain.  She repots that her ears feel like they are under water.      EVALUATION    See Audiogram    RESULTS        Otoscopy and Tympanometry 226 Hz :  Right Ear:  Otoscopy:  Clear ear canal          Tympanometry:  Middle ear function within normal limits    Left Ear:   Otoscopy:  Clear ear canal        Tympanometry:  Reduced pressure and compliance consistent with outer/middle ear involvement    Test technique:  Standard Audiometry     Pure Tone Audiometry:   Patient responded to pure tones at 10-25 dB for 250-8000 Hz in right ear, and at 55-85 dB for 250-8000 Hz in left ear.       Speech Audiometry:        Right Ear:  Speech Reception Threshold (SRT) was obtained at 10 dBHL                 Speech Discrimination scores were 100% in quiet when words were presented at 50 dBHL       Left Ear:  Speech Reception Threshold (SRT) was obtained at 55 dBHL                 Speech Discrimination scores were 96% in masking noise when words were presented at  85 dBHL    Reliability:   good    IMPRESSIONS:  1.  Tympanometry results are consistent with Middle ear function within normal limits in right ear, and Reduced pressure and compliance consistent with outer/middle ear involvement in left ear.  2.  Pure tone results are consistent with hearing sensitivity within normal limits for right ear, and moderate to severe sloping conductive hearing loss  in left ear.       RECOMMENDATIONS:  Patient is seeing the Ear Nose and Throat physician immediately following this examination.  It was a pleasure seeing Janell Suazo in Audiology today.  We would be happy to do  further testing or discuss these test as necessary.          This document has been electronically signed by CARMINA Freedman on March 21, 2018 9:05 AM          CARMINA Freedman  Licensed Audiologist

## 2018-03-22 DIAGNOSIS — E88.81 INSULIN RESISTANCE: ICD-10-CM

## 2018-03-22 DIAGNOSIS — E66.9 OBESITY, UNSPECIFIED CLASSIFICATION, UNSPECIFIED OBESITY TYPE, UNSPECIFIED WHETHER SERIOUS COMORBIDITY PRESENT: Primary | ICD-10-CM

## 2018-04-06 ENCOUNTER — CLINICAL SUPPORT (OUTPATIENT)
Dept: AUDIOLOGY | Facility: CLINIC | Age: 38
End: 2018-04-06

## 2018-04-06 ENCOUNTER — OFFICE VISIT (OUTPATIENT)
Dept: OTOLARYNGOLOGY | Facility: CLINIC | Age: 38
End: 2018-04-06

## 2018-04-06 VITALS — BODY MASS INDEX: 39.06 KG/M2 | TEMPERATURE: 96.9 F | WEIGHT: 279 LBS | HEIGHT: 71 IN

## 2018-04-06 DIAGNOSIS — H90.72 MIXED CONDUCTIVE AND SENSORINEURAL HEARING LOSS OF LEFT EAR WITH UNRESTRICTED HEARING OF RIGHT EAR: ICD-10-CM

## 2018-04-06 DIAGNOSIS — H93.12 TINNITUS OF LEFT EAR: Primary | ICD-10-CM

## 2018-04-06 DIAGNOSIS — H69.82 EUSTACHIAN TUBE DYSFUNCTION, LEFT: Primary | ICD-10-CM

## 2018-04-06 DIAGNOSIS — H69.82 DYSFUNCTION OF LEFT EUSTACHIAN TUBE: ICD-10-CM

## 2018-04-06 PROCEDURE — 92567 TYMPANOMETRY: CPT | Performed by: AUDIOLOGIST

## 2018-04-06 PROCEDURE — 69433 CREATE EARDRUM OPENING: CPT | Performed by: OTOLARYNGOLOGY

## 2018-04-06 RX ORDER — OFLOXACIN 3 MG/ML
4 SOLUTION AURICULAR (OTIC) 2 TIMES DAILY
Qty: 10 ML | Refills: 0 | Status: SHIPPED | OUTPATIENT
Start: 2018-04-06 | End: 2018-05-01

## 2018-04-06 NOTE — PATIENT INSTRUCTIONS

## 2018-04-06 NOTE — PROGRESS NOTES
OPERATIVE NOTE    Name:    Janell Suazo  YOB: 1980  Date of surgery:       Pre-op Diagnosis:   Otitis media with mixed hearing loss  Post-op Diagnosis:   Same  Procedure: Left myringotomy and PE tube placement local anesthesia      Surgeon:  Dex Cunha MD, AAOHNS    Anesthesia:  Topical phenol    Staff:   LEAH Tapia     Estimated Blood Loss:  1 ml    Specimens:                 none      Drains:  none    Findings:  Mucoid OM    Complications: None    IMPLANTS:   PE tube resendiz time     INDICATIONS: Failed medical therapy and failed myringotomy with recurrent otitis media with effusion    PROCEDURE: After getting consent for timeout was carried out the ear was anesthetized with local anesthetic.  Dilute phenol the anteromedial quadrant incision made thick glue-like fluid suctioned and Resendiz PE tube placed patient tolerated it well without endoscopic dictation she was given antibiotic eardrops and to keep her eardrum will follow-up in 3 weeks            This document has been electronically signed by Dex Cunha MD on April 6, 2018 12:51 PM

## 2018-04-06 NOTE — PROGRESS NOTES
TYMPANOMETRY ONLY      Name:  Janell Suazo  :  1980  Age:  38 y.o.  Date of Evaluation:  2018      HISTORY    Reason for visit:  Janell Suazo is seen today for a tympanogram at the request of Dr. Dex Cunha.  Patient reports that the left ear feels like it closed back up.      RESULTS        Otoscopy and Tympanometry 226 Hz :  Right Ear:  Otoscopy:  Clear ear canal          Tympanometry:  Middle ear function within normal limits    Left Ear:   Otoscopy:  Clear ear canal        Tympanometry:  Reduced pressure and compliance consistent with outer/middle ear involvement    IMPRESSIONS:  1.  Tympanometry results are consistent with Middle ear function within normal limits in right ear, and Reduced pressure and compliance consistent with outer/middle ear involvement in left ear.      RECOMMENDATIONS:  Patient is seeing the Ear Nose and Throat physician immediately following this examination.  It was a pleasure seeing Janell Suazo in Audiology today.  We would be happy to do further testing or discuss these test as necessary.          This document has been electronically signed by CARMINA Freedman on 2018 12:32 PM          CARMINA Freedman  Licensed Audiologist

## 2018-04-09 ENCOUNTER — TELEPHONE (OUTPATIENT)
Dept: OTOLARYNGOLOGY | Facility: CLINIC | Age: 38
End: 2018-04-09

## 2018-04-09 NOTE — TELEPHONE ENCOUNTER
----- Message from Urmila Berry sent at 4/9/2018  8:36 AM CDT -----  Regarding: ear tube  Contact: [phone]  Her ear is still ringing, still feels uncomfortable, but is not draining. She just wanted to know if that was normal. She had an ear tube placed Fri.   Thanks :)

## 2018-04-09 NOTE — TELEPHONE ENCOUNTER
Told patient that the ringing and some discomfort is normal and that she can take tylenol or ibuprofen to help with the discomfort. She is worried about the ringing and wants to know how long it will be until it will go away. Told patient that she needs to continue using the ear drops that Dr. Cunha prescribed for her and that it just takes some time for the swelling to go down but hopefully the ringing and wooshing will get better after it does. She is to call us back on Thursday morning to let us know how she is doing then.

## 2018-05-01 ENCOUNTER — OFFICE VISIT (OUTPATIENT)
Dept: OTOLARYNGOLOGY | Facility: CLINIC | Age: 38
End: 2018-05-01

## 2018-05-01 ENCOUNTER — CLINICAL SUPPORT (OUTPATIENT)
Dept: AUDIOLOGY | Facility: CLINIC | Age: 38
End: 2018-05-01

## 2018-05-01 VITALS — HEIGHT: 71 IN | TEMPERATURE: 97.2 F | BODY MASS INDEX: 39.06 KG/M2 | WEIGHT: 279 LBS

## 2018-05-01 DIAGNOSIS — H90.72 MIXED CONDUCTIVE AND SENSORINEURAL HEARING LOSS OF LEFT EAR WITH UNRESTRICTED HEARING OF RIGHT EAR: Primary | ICD-10-CM

## 2018-05-01 DIAGNOSIS — H93.12 TINNITUS OF LEFT EAR: Primary | ICD-10-CM

## 2018-05-01 DIAGNOSIS — H90.72 MIXED CONDUCTIVE AND SENSORINEURAL HEARING LOSS OF LEFT EAR WITH UNRESTRICTED HEARING OF RIGHT EAR: ICD-10-CM

## 2018-05-01 PROCEDURE — 92557 COMPREHENSIVE HEARING TEST: CPT | Performed by: AUDIOLOGIST

## 2018-05-01 PROCEDURE — 92567 TYMPANOMETRY: CPT | Performed by: AUDIOLOGIST

## 2018-05-01 PROCEDURE — 99024 POSTOP FOLLOW-UP VISIT: CPT | Performed by: OTOLARYNGOLOGY

## 2018-05-01 NOTE — PATIENT INSTRUCTIONS

## 2018-05-02 NOTE — PROGRESS NOTES
She comes back in follow-up [place] tinnitus is better but her hearing is improved but still not normal.    On a she reveals of PE tube in place and dry without any unusual drainage.  Her audiogram still shows significant sensorineural component and some conductive component.  This is improved over her preop testing but is not as good as down because her hearing is worsened over the last few months and suggesting we get an MRI.  This asymmetry is persisting Be explained easily just by the fluid she had now that that resolved her hearing is not improved to the degree would expect.  We discussed this in detail.  We'll get the MRI and call her with results and plan further therapy.  We talked about getting a hearing aid evaluation meantime to help with her hearing and tinnitus.  She is agreement with this and then can plan further treatment pending the results the MRI seeing how she is doing with regards to her hearing she is agreement this bleeding seen in the next 3 weeks with the MRI in the interim.  She's not have any vertigo or other neurologic symptoms at this point all questions were answered.  See audiogram results and media which were shown to the patient and reviewed with her.  LILA Cunha MD

## 2018-05-02 NOTE — PROGRESS NOTES
"STANDARD AUDIOMETRIC EVALUATION      Name:  Janell Suazo  :  1980  Age:  38 y.o.  Date of Evaluation:  2018    HISTORY    Reason for visit:  Janell Suazo is seen today for a hearing evaluation at the request of Dr. Dex Cunha.  Patient reports that she is in for a post-op following tubes.  She reports that she is hearing better. She reports that her left ear is achy.  She repots having left sided tinnitus.  She reports that the \"under water\" feeling in her head is not as bad.      EVALUATION    See Audiogram    RESULTS        Otoscopy and Tympanometry 226 Hz :  Right Ear:  Otoscopy:  Clear ear canal          Tympanometry:  Middle ear function within normal limits    Left Ear:   Otoscopy:  Visible PE tube        Tympanometry:  Large ear canal volume consistent with a patent PE tube    Test technique:  Standard Audiometry     Pure Tone Audiometry:   Patient responded to pure tones at 10-25 dB for 250-8000 Hz in right ear, and at 40-75 dB for 250-8000 Hz in left ear.       Speech Audiometry:        Right Ear:  Speech Reception Threshold (SRT) was obtained at 10 dBHL                 Speech Discrimination scores were 100% in quiet when words were presented at 50 dBHL       Left Ear:  Speech Reception Threshold (SRT) was obtained at 40 dBHL                 Speech Discrimination scores were 96% in masking noise when words were presented at  80 dBHL    Reliability:   good    IMPRESSIONS:  1.  Tympanometry results are consistent with Middle ear function within normal limits in right ear, and Large ear canal volume consistent with a patent PE tube in left ear.  2.  Pure tone results are consistent with hearing sensitivity within normal limits for right ear, and moderate to severe reverse cookie bite mixed hearing loss  in left ear.     RECOMMENDATIONS:  Patient is seeing the Ear Nose and Throat physician immediately following this examination.  It was a pleasure seeing Janell Suazo in " Audiology today.  We would be happy to do further testing or discuss these test as necessary.      This document has been electronically signed by CARMINA Freedman on May 2, 2018 8:02 AM          CARMINA Freedman  Licensed Audiologist

## 2018-05-03 DIAGNOSIS — H90.5 SENSORINEURAL HEARING LOSS (SNHL) OF LEFT EAR, UNSPECIFIED HEARING STATUS ON CONTRALATERAL SIDE: Primary | ICD-10-CM

## 2018-05-07 ENCOUNTER — TELEPHONE (OUTPATIENT)
Dept: OTOLARYNGOLOGY | Facility: CLINIC | Age: 38
End: 2018-05-07

## 2018-05-07 NOTE — TELEPHONE ENCOUNTER
----- Message from Urmila Berry sent at 5/7/2018  8:44 AM CDT -----  Regarding: EAR PAIN  TALKED TO PT AND SHE SAID HER EAR WAS HURTING, SHE IS ABOUT A 9 ON PAIN LEVEL.

## 2018-05-07 NOTE — TELEPHONE ENCOUNTER
Spoke with patient who said that her ear is hurting and she does not understand why and that she is scared. She said that she does not have any drainage but says that it feels wet. She does not have a fever.  I told her to use the Floxin drops that she was prescribed before, 5 drops twice a day for 5 days. She is to call us back if it is not feeling better after 5 days. I told her we would see what her MRI says whenever she has it done on Thursday and go from there.    She is to call our office if she has any other questions or concerns.

## 2018-05-11 ENCOUNTER — TELEPHONE (OUTPATIENT)
Dept: OTOLARYNGOLOGY | Facility: CLINIC | Age: 38
End: 2018-05-11

## 2018-05-11 RX ORDER — MOMETASONE FUROATE 50 UG/1
2 SPRAY, METERED NASAL 2 TIMES DAILY
Qty: 17 G | Refills: 11 | Status: SHIPPED | OUTPATIENT
Start: 2018-05-11 | End: 2018-06-07

## 2018-05-11 RX ORDER — CEFDINIR 300 MG/1
300 CAPSULE ORAL 2 TIMES DAILY
Qty: 42 CAPSULE | Refills: 0 | Status: SHIPPED | OUTPATIENT
Start: 2018-05-11 | End: 2018-06-07

## 2018-05-11 NOTE — TELEPHONE ENCOUNTER
Patient call back regarding MRI report.  I reviewed with her some the findings.  I've called in antibiotics in nature she has nasal spray to try and help the sinus portion of her symptoms after calling back in 3 weeks and we'll reassess to call for questions or problems explained use and side effects of medications importance of taking with probiotics.  LILA Cunha MD

## 2018-06-07 ENCOUNTER — OFFICE VISIT (OUTPATIENT)
Dept: OTOLARYNGOLOGY | Facility: CLINIC | Age: 38
End: 2018-06-07

## 2018-06-07 VITALS — WEIGHT: 279 LBS | HEIGHT: 71 IN | BODY MASS INDEX: 39.06 KG/M2 | TEMPERATURE: 96.9 F

## 2018-06-07 DIAGNOSIS — H90.5 SENSORINEURAL HEARING LOSS (SNHL) OF LEFT EAR, UNSPECIFIED HEARING STATUS ON CONTRALATERAL SIDE: Primary | ICD-10-CM

## 2018-06-07 DIAGNOSIS — J32.8 OTHER CHRONIC SINUSITIS: ICD-10-CM

## 2018-06-07 DIAGNOSIS — J32.4 CHRONIC PANSINUSITIS: ICD-10-CM

## 2018-06-07 PROCEDURE — 99213 OFFICE O/P EST LOW 20 MIN: CPT | Performed by: OTOLARYNGOLOGY

## 2018-06-07 RX ORDER — MOMETASONE FUROATE 50 UG/1
2 SPRAY, METERED NASAL 2 TIMES DAILY
Qty: 17 G | Refills: 5 | Status: SHIPPED | OUTPATIENT
Start: 2018-06-07 | End: 2018-06-07 | Stop reason: SDUPTHER

## 2018-06-07 RX ORDER — TRIAMCINOLONE ACETONIDE 55 UG/1
2 SPRAY, METERED NASAL DAILY
Qty: 16.5 G | Refills: 11 | Status: SHIPPED | OUTPATIENT
Start: 2018-06-07 | End: 2019-02-27

## 2018-06-07 RX ORDER — PREDNISONE 10 MG/1
TABLET ORAL
Qty: 42 TABLET | Refills: 0 | Status: SHIPPED | OUTPATIENT
Start: 2018-06-07 | End: 2019-02-27

## 2018-06-07 RX ORDER — AMOXICILLIN AND CLAVULANATE POTASSIUM 875; 125 MG/1; MG/1
1 TABLET, FILM COATED ORAL EVERY 12 HOURS
Qty: 28 TABLET | Refills: 0 | Status: SHIPPED | OUTPATIENT
Start: 2018-06-07 | End: 2019-02-27

## 2018-06-07 NOTE — PATIENT INSTRUCTIONS

## 2018-06-08 NOTE — PROGRESS NOTES
Subjective   Janell Suazo is a 38 y.o. female.   CC follow-up MRI for asymmetric hearing loss    History of Present Illness   Patient comes back review of her MRI she says she still having tinnitus left more than right she also is very congested and having drainage she says she took all her antibiotics and facial pressure trouble breathing through her nose difficulty using nasal spray she's not had any epistaxis does have rhinitis and postnasal drip no fever      The following portions of the patient's history were reviewed and updated as appropriate: allergies, current medications, past family history, past medical history, past social history, past surgical history and problem list.      Current Outpatient Prescriptions:   •  amoxicillin-clavulanate (AUGMENTIN) 875-125 MG per tablet, Take 1 tablet by mouth Every 12 (Twelve) Hours., Disp: 28 tablet, Rfl: 0  •  predniSONE (DELTASONE) 10 MG tablet, 6 tabs daily x 2 days, 5 tabs daily x 2 days, 4 tabs x 2 days, 3 tablets x 2 days, 2 tablets x 2 days, and 1 tab x 2 days., Disp: 42 tablet, Rfl: 0  •  Triamcinolone Acetonide (NASACORT ALLERGY 24HR) 55 MCG/ACT nasal inhaler, 2 sprays into each nostril Daily., Disp: 16.5 g, Rfl: 11    No Known Allergies          Review of Systems   Constitutional: Positive for fever.   HENT: Positive for congestion, postnasal drip, rhinorrhea and sinus pressure. Negative for facial swelling and nosebleeds.    Eyes: Negative for visual disturbance.   Hematological: Negative for adenopathy.           Objective   Physical Exam   Constitutional: She is oriented to person, place, and time. She appears well-developed and well-nourished.   HENT:   Head: Normocephalic and atraumatic.   Right Ear: Hearing, tympanic membrane, external ear and ear canal normal. No mastoid tenderness.   Left Ear: External ear and ear canal normal. No mastoid tenderness. Tympanic membrane is not injected, not scarred and not erythematous. A middle ear effusion is  present.   Nose: Mucosal edema, rhinorrhea, nasal deformity and septal deviation present. No epistaxis. Right sinus exhibits no maxillary sinus tenderness and no frontal sinus tenderness. Left sinus exhibits no maxillary sinus tenderness and no frontal sinus tenderness.   Mouth/Throat: Uvula is midline, oropharynx is clear and moist and mucous membranes are normal. No trismus in the jaw. Normal dentition. No oropharyngeal exudate or posterior oropharyngeal edema. No tonsillar exudate.   Eyes: Conjunctivae and EOM are normal.   Neck: Normal range of motion. Neck supple. No JVD present. No tracheal deviation present. No thyromegaly present.   Cardiovascular: Normal rate.    Pulmonary/Chest: Effort normal.   Musculoskeletal: Normal range of motion.   Lymphadenopathy:        Head (right side): No submental, no submandibular, no tonsillar, no preauricular, no posterior auricular and no occipital adenopathy present.        Head (left side): No submental, no submandibular, no tonsillar, no preauricular, no posterior auricular and no occipital adenopathy present.     She has no cervical adenopathy.        Right cervical: No superficial cervical, no deep cervical and no posterior cervical adenopathy present.       Left cervical: No superficial cervical, no deep cervical and no posterior cervical adenopathy present.   Neurological: She is alert and oriented to person, place, and time. No cranial nerve deficit.   Skin: Skin is warm.   Psychiatric: She has a normal mood and affect. Her speech is normal and behavior is normal. Thought content normal.   Nursing note and vitals reviewed.    MRI reveals normal findings no evidence of ear infection or tumor but does have significant sinusitis      Assessment/Plan   Janell was seen today for follow-up.    Diagnoses and all orders for this visit:    Sensorineural hearing loss (SNHL) of left ear, unspecified hearing status on contralateral side    Other chronic sinusitis    Chronic  pansinusitis    Other orders  -     predniSONE (DELTASONE) 10 MG tablet; 6 tabs daily x 2 days, 5 tabs daily x 2 days, 4 tabs x 2 days, 3 tablets x 2 days, 2 tablets x 2 days, and 1 tab x 2 days.  -     amoxicillin-clavulanate (AUGMENTIN) 875-125 MG per tablet; Take 1 tablet by mouth Every 12 (Twelve) Hours.  -     Discontinue: mometasone (NASONEX) 50 MCG/ACT nasal spray; 2 sprays into each nostril 2 (Two) Times a Day.  -     Triamcinolone Acetonide (NASACORT ALLERGY 24HR) 55 MCG/ACT nasal inhaler; 2 sprays into each nostril Daily.    Because the patient's MRI shows significant pansinusitis placing her on anti-swelling medicines for her nose as well as a different antibiotic inserts eventually may require sinus surgery.  We will consider CT scan prior to that after treatment.  She did not respond her recent antibiotics and nasal sprays so going to be more aggressive explain use and side effects she understands all questions were answered she said she's not a diabetic this point.  We discussed use and side effects of all medications probiotics and how to take the medications she sees Afrin for 4 days and discontinue.  She'll call if not improving but otherwise will follow up in 3 weeks

## 2018-09-28 ENCOUNTER — TELEPHONE (OUTPATIENT)
Dept: OTOLARYNGOLOGY | Facility: CLINIC | Age: 38
End: 2018-09-28

## 2018-09-28 NOTE — TELEPHONE ENCOUNTER
Spoke to patient, she is still having some ringing in her ears, but she is not able to come in for a follow up at this time.  She said she will try to come in for follow up and will discuss surgery at that time.

## 2019-03-19 ENCOUNTER — OFFICE VISIT (OUTPATIENT)
Dept: GASTROENTEROLOGY | Facility: CLINIC | Age: 39
End: 2019-03-19

## 2019-03-19 VITALS
HEIGHT: 71 IN | SYSTOLIC BLOOD PRESSURE: 122 MMHG | DIASTOLIC BLOOD PRESSURE: 82 MMHG | WEIGHT: 287.8 LBS | HEART RATE: 91 BPM | BODY MASS INDEX: 40.29 KG/M2

## 2019-03-19 DIAGNOSIS — K21.9 GASTROESOPHAGEAL REFLUX DISEASE, ESOPHAGITIS PRESENCE NOT SPECIFIED: ICD-10-CM

## 2019-03-19 DIAGNOSIS — K92.1 BLOOD IN STOOL: ICD-10-CM

## 2019-03-19 DIAGNOSIS — R19.4 CHANGE IN BOWEL HABITS: Primary | ICD-10-CM

## 2019-03-19 DIAGNOSIS — R10.84 GENERALIZED ABDOMINAL PAIN: ICD-10-CM

## 2019-03-19 DIAGNOSIS — R11.0 NAUSEA: ICD-10-CM

## 2019-03-19 PROCEDURE — 99214 OFFICE O/P EST MOD 30 MIN: CPT | Performed by: NURSE PRACTITIONER

## 2019-03-19 RX ORDER — DEXTROSE AND SODIUM CHLORIDE 5; .45 G/100ML; G/100ML
30 INJECTION, SOLUTION INTRAVENOUS CONTINUOUS PRN
Status: CANCELLED | OUTPATIENT
Start: 2019-04-17

## 2019-03-19 RX ORDER — SODIUM, POTASSIUM,MAG SULFATES 17.5-3.13G
1 SOLUTION, RECONSTITUTED, ORAL ORAL EVERY 12 HOURS
Qty: 2 BOTTLE | Refills: 0 | Status: SHIPPED | OUTPATIENT
Start: 2019-03-19 | End: 2019-04-17 | Stop reason: HOSPADM

## 2019-03-19 NOTE — PROGRESS NOTES
Chief Complaint   Patient presents with   • blood in stool     3x       Subjective    Janell Suazo is a 39 y.o. female. she is being seen for consultation today at the request of JAN Velasquez    39-year-old female presents to discuss bright red blood in stool history of IBS changes in bowel habits and abdominal pain and nausea.  She also reports intermittent abdominal cramping.    Rectal Bleeding   This is a chronic problem. The current episode started more than 1 year ago. The problem occurs intermittently. The problem has been waxing and waning. Associated symptoms include abdominal pain, fatigue and nausea. Pertinent negatives include no anorexia, arthralgias, change in bowel habit, chest pain, chills, congestion, coughing, diaphoresis, fever, headaches, joint swelling or myalgias.   Heartburn   She complains of abdominal pain, heartburn and nausea. She reports no belching, no chest pain, no choking, no coughing, no dysphagia, no early satiety, no globus sensation or no hoarse voice. This is a chronic problem. The problem has been gradually worsening. The heartburn duration is several minutes. The heartburn is located in the substernum. Associated symptoms include fatigue.     Plan; schedule patient for EGD and colonoscopy due to blood in stool changes in bowel habits abdominal pain.  Schedule patient for ultrasound due to nausea.  Follow-up after test return office sooner if needed       The following portions of the patient's history were reviewed and updated as appropriate:   Past Medical History:   Diagnosis Date   • Acute otitis media with effusion    • Allergic rhinitis    • Amenorrhea    • Anxiety state    • Diarrhea    • Encounter for gynecological examination    • Encounter for gynecological examination without abnormal finding    • Epigastric pain     Burning epigastric pain    • Fatigue    • Gastroesophageal reflux disease    • Hip pain    • Knee pain    • Laboratory examination ordered as  part of a routine general medical examination in pediatric patient     General examination of patient    • Low back pain    • Lumbosacral strain    • Metabolic syndrome X    • Morbid obesity (CMS/HCC)     BMI 40.4    • Nausea and vomiting    • Obesity     Hyperinsulinar obesity    • Otitis media    • Pain in limb     right limb   • Rhinitis      Past Surgical History:   Procedure Laterality Date   • DIAGNOSTIC LAPAROSCOPY  06/21/2007    Exam under anesthesia, diagnostic laparoscopy, diagnostic hysteroscopy, fractional dilatation and curetttage   • INJECTION OF MEDICATION  11/05/2012    Kenalog (1)  -  SILVESTRE Valdes   • PAP SMEAR  08/08/2011    Negative   • SALPINGO OOPHORECTOMY  2004    Left   [place], [place]   • TUBAL ABDOMINAL LIGATION  2010    [place], [place]     Family History   Problem Relation Age of Onset   • Colon cancer Other         Colorectal cancer   • Diabetes Other    • Hypertension Other    • Thyroid disease Other      OB History     No data available        Prior to Admission medications    Not on File     No Known Allergies  Social History     Socioeconomic History   • Marital status: Single     Spouse name: Not on file   • Number of children: Not on file   • Years of education: Not on file   • Highest education level: Not on file   Tobacco Use   • Smoking status: Never Smoker   • Smokeless tobacco: Never Used   Substance and Sexual Activity   • Alcohol use: No   • Drug use: No   • Sexual activity: Yes     Partners: Male     Birth control/protection: Surgical       Review of Systems  Review of Systems   Constitutional: Positive for fatigue. Negative for chills, diaphoresis and fever.   HENT: Negative for congestion and hoarse voice.    Respiratory: Negative for cough and choking.    Cardiovascular: Negative for chest pain.   Gastrointestinal: Positive for abdominal pain, heartburn, hematochezia and nausea. Negative for anorexia, change in bowel habit and dysphagia.   Musculoskeletal: Negative for  "arthralgias, joint swelling and myalgias.   Neurological: Negative for headaches.        /82 (BP Location: Left arm)   Pulse 91   Ht 180.3 cm (71\")   Wt 131 kg (287 lb 12.8 oz)   BMI 40.14 kg/m²     Objective    Physical Exam   Constitutional: She is oriented to person, place, and time. She appears well-developed and well-nourished. She is cooperative. No distress.   HENT:   Head: Normocephalic and atraumatic.   Neck: Normal range of motion. Neck supple. No thyromegaly present.   Cardiovascular: Normal rate, regular rhythm and normal heart sounds.   Pulmonary/Chest: Effort normal and breath sounds normal. She has no wheezes. She has no rhonchi. She has no rales.   Abdominal: Soft. Normal appearance and bowel sounds are normal. She exhibits no shifting dullness, no distension, no fluid wave and no ascites. There is no hepatosplenomegaly. There is no tenderness. There is no rigidity and no guarding. No hernia.   Lymphadenopathy:     She has no cervical adenopathy.   Neurological: She is alert and oriented to person, place, and time.   Skin: Skin is warm, dry and intact. No rash noted. No pallor.   Psychiatric: She has a normal mood and affect. Her speech is normal.     Admission on 01/13/2018, Discharged on 01/13/2018   Component Date Value Ref Range Status   • Color 01/13/2018 Yellow  Yellow, Straw, Dark Yellow, Rosanna Final   • Clarity, UA 01/13/2018 Clear  Clear Final   • Glucose, UA 01/13/2018 250 mg/dL* Negative, 1000 mg/dL (3+) mg/dL Final   • Bilirubin 01/13/2018 Negative  Negative Final   • Ketones, UA 01/13/2018 Negative  Negative Final   • Specific Gravity  01/13/2018 1.030  1.005 - 1.030 Final   • Blood, UA 01/13/2018 Negative  Negative Final   • pH, Urine 01/13/2018 5.5  5.0 - 8.0 Final   • Protein, POC 01/13/2018 Trace* Negative mg/dL Final   • Urobilinogen, UA 01/13/2018 1 E.U./dL * Normal Final   • Leukocytes 01/13/2018 Negative  Negative Final   • Nitrite, UA 01/13/2018 Negative  Negative " Final     Assessment/Plan      1. Change in bowel habits    2. Gastroesophageal reflux disease, esophagitis presence not specified    3. Blood in stool    4. Generalized abdominal pain    5. Nausea    .       Orders placed during this encounter include:  Orders Placed This Encounter   Procedures   • US Abdomen Limited     Scheduling Instructions:      RUQ     Order Specific Question:   Reason for Exam:     Answer:   nausea, abd pain   • Follow Anesthesia Guidelines / Standing Orders     Standing Status:   Future       ESOPHAGOGASTRODUODENOSCOPY (N/A), COLONOSCOPY (N/A)    Review and/or summary of lab tests, radiology, procedures, medications. Review and summary of old records and obtaining of history. The risks and benefits of my recommendations, as well as other treatment options were discussed with the patient today. Questions were answered.    New Medications Ordered This Visit   Medications   • sodium-potassium-magnesium sulfates (SUPREP BOWEL PREP KIT) 17.5-3.13-1.6 GM/177ML solution oral solution     Sig: Take 1 bottle by mouth Every 12 (Twelve) Hours.     Dispense:  2 bottle     Refill:  0       Follow-up: Return in about 4 weeks (around 4/16/2019).          This document has been electronically signed by JAN Gonzalez on March 29, 2019 4:09 PM             Results for orders placed or performed in visit on 03/20/19   Glia(IgA / G) & TTG(IgA / G)   Result Value Ref Range    Gliadin Deamidated Peptide Ab, IgA 6 0 - 19 units    Deaminated Gliadin Ab IgG 2 0 - 19 units    Tissue Transglutaminase IgA <2 0 - 3 U/mL    Tissue Transglutaminase IgG <2 0 - 5 U/mL   Allergens (12) Foods   Result Value Ref Range    Class Description Comment     Egg White <0.10 Class 0 kU/L    Milk, Cow's <0.10 Class 0 kU/L    CodFish <0.10 Class 0 kU/L    Sesame Seed 0.69 (A) Class II kU/L    Peanut 0.10 (A) Class 0/I kU/L    Soybean <0.10 Class 0 kU/L    Hazelnut <0.10 Class 0 kU/L    Shrimp 0.14 (A) Class 0/I kU/L    Scallop <0.10  Class 0 kU/L    Gluten <0.10 Class 0 kU/L    [place] <0.10 Class 0 kU/L    Wheat <0.10 Class 0 kU/L   Results for orders placed or performed in visit on 03/20/19   CBC Auto Differential   Result Value Ref Range    WBC 6.59 3.40 - 10.80 10*3/mm3    RBC 4.97 3.77 - 5.28 10*6/mm3    Hemoglobin 13.9 12.0 - 15.9 g/dL    Hematocrit 41.8 34.0 - 46.6 %    MCV 84.1 79.0 - 97.0 fL    MCH 28.0 26.6 - 33.0 pg    MCHC 33.3 31.5 - 35.7 g/dL    RDW 13.1 12.3 - 15.4 %    RDW-SD 39.8 37.0 - 54.0 fl    MPV 9.7 6.0 - 12.0 fL    Platelets 305 140 - 450 10*3/mm3    Neutrophil % 50.1 42.7 - 76.0 %    Lymphocyte % 36.3 19.6 - 45.3 %    Monocyte % 7.7 5.0 - 12.0 %    Eosinophil % 4.6 0.3 - 6.2 %    Basophil % 0.8 0.0 - 1.5 %    Immature Grans % 0.5 0.0 - 0.5 %    Neutrophils, Absolute 3.31 1.40 - 7.00 10*3/mm3    Lymphocytes, Absolute 2.39 0.70 - 3.10 10*3/mm3    Monocytes, Absolute 0.51 0.10 - 0.90 10*3/mm3    Eosinophils, Absolute 0.30 0.00 - 0.40 10*3/mm3    Basophils, Absolute 0.05 0.00 - 0.20 10*3/mm3    Immature Grans, Absolute 0.03 0.00 - 0.05 10*3/mm3    nRBC 0.0 0.0 - 0.0 /100 WBC   Comprehensive Metabolic Panel   Result Value Ref Range    Glucose 99 60 - 100 mg/dL    BUN 17 7 - 21 mg/dL    Creatinine 0.89 0.50 - 1.00 mg/dL    Sodium 136 (L) 137 - 145 mmol/L    Potassium 3.9 3.5 - 5.1 mmol/L    Chloride 99 95 - 110 mmol/L    CO2 28.0 22.0 - 31.0 mmol/L    Calcium 9.1 8.4 - 10.2 mg/dL    Total Protein 7.5 6.3 - 8.6 g/dL    Albumin 4.30 3.40 - 4.80 g/dL    ALT (SGPT) 24 9 - 52 U/L    AST (SGOT) 24 14 - 36 U/L    Alkaline Phosphatase 71 38 - 126 U/L    Total Bilirubin 0.4 0.2 - 1.3 mg/dL    eGFR Non  Amer 71 64 - 149 mL/min/1.73    Globulin 3.2 2.3 - 3.5 gm/dL    A/G Ratio 1.3 1.1 - 1.8 g/dL    BUN/Creatinine Ratio 19.1 7.0 - 25.0    Anion Gap 9.0 5.0 - 15.0 mmol/L   Results for orders placed or performed during the hospital encounter of 01/13/18   POCT Urinalysis (automated dipstick)   Result Value Ref Range    Color Yellow  Yellow, Straw, Dark Yellow, Rosanna    Clarity, UA Clear Clear    Glucose,  mg/dL (A) Negative, 1000 mg/dL (3+) mg/dL    Bilirubin Negative Negative    Ketones, UA Negative Negative    Specific Gravity  1.030 1.005 - 1.030    Blood, UA Negative Negative    pH, Urine 5.5 5.0 - 8.0    Protein, POC Trace (A) Negative mg/dL    Urobilinogen, UA 1 E.U./dL  (A) Normal    Leukocytes Negative Negative    Nitrite, UA Negative Negative   Results for orders placed or performed during the hospital encounter of 08/20/17   POCT Urinalysis (automated dipstick)   Result Value Ref Range    Color Dark Yellow Yellow, Straw, Dark Yellow, Rosanna    Clarity, UA Clear Clear    Glucose, UA Negative Negative, 1000 mg/dL (3+) mg/dL    Bilirubin Small (1+) (A) Negative    Ketones, UA Negative Negative    Specific Gravity  1.025 1.005 - 1.030    Blood, UA Moderate (A) Negative    pH, Urine 6.0 5.0 - 8.0    Protein, POC Trace (A) Negative mg/dL    Urobilinogen, UA Normal Normal    Leukocytes Moderate (2+) (A) Negative    Nitrite, UA Negative Negative   Results for orders placed or performed in visit on 03/20/17   TSH   Result Value Ref Range    TSH 3.140 0.460 - 4.680 mIU/mL   Hemoglobin A1c   Result Value Ref Range    Hemoglobin A1C 5.55 4 - 5.6 %   Results for orders placed or performed in visit on 03/20/17   Urine Culture   Result Value Ref Range    Urine Culture Mixed Culture    Results for orders placed or performed in visit on 08/20/16    PAP SMEAR   Result Value Ref Range     Pap smear Complete    Results for orders placed or performed during the hospital encounter of 03/02/16   LDL cholesterol, direct   Result Value Ref Range    LDL Cholesterol  114 0 - 129 mg/dl   Hemoglobin A1c   Result Value Ref Range    Hemoglobin A1C 5.4 4.0 - 5.6 %Totb   Lipid panel   Result Value Ref Range    Total Cholesterol 191 0 - 199 mg/dl    Triglycerides 161 20 - 199 mg/dl    HDL Cholesterol 46 (L) 60 - 200 mg/dl    LDL Cholesterol  113 0 - 129  mg/dl   Results for orders placed or performed during the hospital encounter of 10/28/15   Converted (Historical) Gyn Cytology   Result Value Ref Range    Spec Descr 1 SPECIMEN(S): Cervical/Endocervical     Clinical Information       CLINICAL HISTORY: Reason for Pap Smear: Routine Smear, LMP: 10/10/15    ICD9 Diagnosis Code 1 ICD-9: V72.31    HPV DNA probe, direct   Result Value Ref Range    HPV Aptima Negative Negative   Results for orders placed or performed during the hospital encounter of 08/28/15   Insulin, total   Result Value Ref Range    Insulin 143.5 (H) 2.6 - 24.9 uIU/mL   CBC and Differential   Result Value Ref Range    WBC 9.3 3.2 - 9.8 x1000/uL    RBC 4.69 3.77 - 5.16 bryan/mm3    Hemoglobin 13.2 12.0 - 15.5 gm/dl    Hematocrit 39.7 35.0 - 45.0 %    MCV 84.6 80.0 - 98.0 fl    MCH 28.1 26.0 - 34.0 pg    MCHC 33.2 31.4 - 36.0 gm/dl    RDW 12.6 11.5 - 14.5 %    Platelets 304 150 - 450 x1000/mm3    MPV 10.4 8.0 - 12.0 fl    Neutrophil Rel % 60.6 37.0 - 80.0 %    Lymphocyte Rel % 25.8 10.0 - 50.0 %    Monocyte Rel % 7.7 0.0 - 12.0 %    Eosinophil Rel % 5.1 0.0 - 7.0 %    Basophil Rel % 0.4 0.0 - 2.0 %    Immature Granulocyte Rel % 0.40 0.00 - 0.50 %    Neutrophils Absolute 5.60 2.00 - 8.60 x1000/uL    Lymphocytes Absolute 2.39 0.60 - 4.20 x1000/uL    Monocytes Absolute 0.71 0.00 - 0.90 x1000/uL    Eosinophils Absolute 0.47 0.00 - 0.70 x1000/uL    Basophils Absolute 0.04 0.00 - 0.20 x1000/uL    Immature Granulocytes Absolute 0.040 (H) 0.005 - 0.022 x1000/uL     *Note: Due to a large number of results and/or encounters for the requested time period, some results have not been displayed. A complete set of results can be found in Results Review.

## 2019-03-20 ENCOUNTER — HOSPITAL ENCOUNTER (OUTPATIENT)
Dept: ULTRASOUND IMAGING | Facility: HOSPITAL | Age: 39
Discharge: HOME | End: 2019-03-20
Admitting: NURSE PRACTITIONER

## 2019-03-20 ENCOUNTER — LAB (OUTPATIENT)
Dept: LAB | Facility: HOSPITAL | Age: 39
End: 2019-03-20

## 2019-03-20 ENCOUNTER — TELEPHONE (OUTPATIENT)
Dept: GASTROENTEROLOGY | Facility: CLINIC | Age: 39
End: 2019-03-20

## 2019-03-20 DIAGNOSIS — K92.1 BLOOD IN STOOL: Primary | ICD-10-CM

## 2019-03-20 DIAGNOSIS — R10.84 GENERALIZED ABDOMINAL PAIN: ICD-10-CM

## 2019-03-20 DIAGNOSIS — K92.1 BLOOD IN STOOL: ICD-10-CM

## 2019-03-20 LAB
ALBUMIN SERPL-MCNC: 4.3 G/DL (ref 3.4–4.8)
ALBUMIN/GLOB SERPL: 1.3 G/DL (ref 1.1–1.8)
ALP SERPL-CCNC: 71 U/L (ref 38–126)
ALT SERPL W P-5'-P-CCNC: 24 U/L (ref 9–52)
ANION GAP SERPL CALCULATED.3IONS-SCNC: 9 MMOL/L (ref 5–15)
AST SERPL-CCNC: 24 U/L (ref 14–36)
BASOPHILS # BLD AUTO: 0.05 10*3/MM3 (ref 0–0.2)
BASOPHILS NFR BLD AUTO: 0.8 % (ref 0–1.5)
BILIRUB SERPL-MCNC: 0.4 MG/DL (ref 0.2–1.3)
BUN BLD-MCNC: 17 MG/DL (ref 7–21)
BUN/CREAT SERPL: 19.1 (ref 7–25)
CALCIUM SPEC-SCNC: 9.1 MG/DL (ref 8.4–10.2)
CHLORIDE SERPL-SCNC: 99 MMOL/L (ref 95–110)
CO2 SERPL-SCNC: 28 MMOL/L (ref 22–31)
CREAT BLD-MCNC: 0.89 MG/DL (ref 0.5–1)
DEPRECATED RDW RBC AUTO: 39.8 FL (ref 37–54)
EOSINOPHIL # BLD AUTO: 0.3 10*3/MM3 (ref 0–0.4)
EOSINOPHIL NFR BLD AUTO: 4.6 % (ref 0.3–6.2)
ERYTHROCYTE [DISTWIDTH] IN BLOOD BY AUTOMATED COUNT: 13.1 % (ref 12.3–15.4)
GFR SERPL CREATININE-BSD FRML MDRD: 71 ML/MIN/1.73 (ref 64–149)
GLOBULIN UR ELPH-MCNC: 3.2 GM/DL (ref 2.3–3.5)
GLUCOSE BLD-MCNC: 99 MG/DL (ref 60–100)
HCT VFR BLD AUTO: 41.8 % (ref 34–46.6)
HGB BLD-MCNC: 13.9 G/DL (ref 12–15.9)
IMM GRANULOCYTES # BLD AUTO: 0.03 10*3/MM3 (ref 0–0.05)
IMM GRANULOCYTES NFR BLD AUTO: 0.5 % (ref 0–0.5)
LYMPHOCYTES # BLD AUTO: 2.39 10*3/MM3 (ref 0.7–3.1)
LYMPHOCYTES NFR BLD AUTO: 36.3 % (ref 19.6–45.3)
MCH RBC QN AUTO: 28 PG (ref 26.6–33)
MCHC RBC AUTO-ENTMCNC: 33.3 G/DL (ref 31.5–35.7)
MCV RBC AUTO: 84.1 FL (ref 79–97)
MONOCYTES # BLD AUTO: 0.51 10*3/MM3 (ref 0.1–0.9)
MONOCYTES NFR BLD AUTO: 7.7 % (ref 5–12)
NEUTROPHILS # BLD AUTO: 3.31 10*3/MM3 (ref 1.4–7)
NEUTROPHILS NFR BLD AUTO: 50.1 % (ref 42.7–76)
NRBC BLD AUTO-RTO: 0 /100 WBC (ref 0–0)
PLATELET # BLD AUTO: 305 10*3/MM3 (ref 140–450)
PMV BLD AUTO: 9.7 FL (ref 6–12)
POTASSIUM BLD-SCNC: 3.9 MMOL/L (ref 3.5–5.1)
PROT SERPL-MCNC: 7.5 G/DL (ref 6.3–8.6)
RBC # BLD AUTO: 4.97 10*6/MM3 (ref 3.77–5.28)
SODIUM BLD-SCNC: 136 MMOL/L (ref 137–145)
WBC NRBC COR # BLD: 6.59 10*3/MM3 (ref 3.4–10.8)

## 2019-03-20 PROCEDURE — 80053 COMPREHEN METABOLIC PANEL: CPT | Performed by: NURSE PRACTITIONER

## 2019-03-20 PROCEDURE — 86003 ALLG SPEC IGE CRUDE XTRC EA: CPT

## 2019-03-20 PROCEDURE — 76705 ECHO EXAM OF ABDOMEN: CPT

## 2019-03-20 PROCEDURE — 83516 IMMUNOASSAY NONANTIBODY: CPT

## 2019-03-20 PROCEDURE — 36415 COLL VENOUS BLD VENIPUNCTURE: CPT | Performed by: NURSE PRACTITIONER

## 2019-03-20 PROCEDURE — 85025 COMPLETE CBC W/AUTO DIFF WBC: CPT | Performed by: NURSE PRACTITIONER

## 2019-03-20 NOTE — TELEPHONE ENCOUNTER
Attempted to contact patient regarding lab results. Left a message with normal results on patient voicemail.

## 2019-03-21 LAB
GLIADIN PEPTIDE IGA SER-ACNC: 6 UNITS (ref 0–19)
GLIADIN PEPTIDE IGG SER-ACNC: 2 UNITS (ref 0–19)
TTG IGA SER-ACNC: <2 U/ML (ref 0–3)
TTG IGG SER-ACNC: <2 U/ML (ref 0–5)

## 2019-03-27 ENCOUNTER — TELEPHONE (OUTPATIENT)
Dept: GASTROENTEROLOGY | Facility: CLINIC | Age: 39
End: 2019-03-27

## 2019-03-27 LAB
CALIF WALNUT POLN IGE QN: <0.1 KU/L
CODFISH IGE QN: <0.1 KU/L
CONV CLASS DESCRIPTION: ABNORMAL
COW MILK IGE QN: <0.1 KU/L
EGG WHITE IGE QN: <0.1 KU/L
GLUTEN IGE QN: <0.1 KU/L
HAZELNUT IGE QN: <0.1 KU/L
PEANUT IGE QN: 0.1 KU/L
SCALLOP IGE QN: <0.1 KU/L
SESAME SEED IGE: 0.69 KU/L
SHRIMP IGE: 0.14 KU/L
SOYBEAN IGE QN: <0.1 KU/L
WHEAT IGE QN: <0.1 KU/L

## 2019-03-27 NOTE — TELEPHONE ENCOUNTER
Contacted patient regarding lab results. Relayed results and recommendations to patient who voiced understanding.

## 2019-04-17 ENCOUNTER — HOSPITAL ENCOUNTER (OUTPATIENT)
Facility: HOSPITAL | Age: 39
Setting detail: HOSPITAL OUTPATIENT SURGERY
Discharge: HOME | End: 2019-04-17
Attending: INTERNAL MEDICINE | Admitting: INTERNAL MEDICINE

## 2019-04-17 ENCOUNTER — ANESTHESIA (OUTPATIENT)
Dept: GASTROENTEROLOGY | Facility: HOSPITAL | Age: 39
End: 2019-04-17

## 2019-04-17 ENCOUNTER — ANESTHESIA EVENT (OUTPATIENT)
Dept: GASTROENTEROLOGY | Facility: HOSPITAL | Age: 39
End: 2019-04-17

## 2019-04-17 VITALS
RESPIRATION RATE: 18 BRPM | DIASTOLIC BLOOD PRESSURE: 61 MMHG | TEMPERATURE: 97.7 F | SYSTOLIC BLOOD PRESSURE: 104 MMHG | WEIGHT: 282.13 LBS | BODY MASS INDEX: 39.5 KG/M2 | OXYGEN SATURATION: 95 % | HEART RATE: 87 BPM | HEIGHT: 71 IN

## 2019-04-17 DIAGNOSIS — R10.84 GENERALIZED ABDOMINAL PAIN: ICD-10-CM

## 2019-04-17 DIAGNOSIS — K92.1 BLOOD IN STOOL: ICD-10-CM

## 2019-04-17 DIAGNOSIS — R19.4 CHANGE IN BOWEL HABITS: ICD-10-CM

## 2019-04-17 DIAGNOSIS — K21.9 GASTROESOPHAGEAL REFLUX DISEASE, ESOPHAGITIS PRESENCE NOT SPECIFIED: ICD-10-CM

## 2019-04-17 PROCEDURE — 25010000002 PROPOFOL 10 MG/ML EMULSION: Performed by: NURSE ANESTHETIST, CERTIFIED REGISTERED

## 2019-04-17 PROCEDURE — 45380 COLONOSCOPY AND BIOPSY: CPT | Performed by: INTERNAL MEDICINE

## 2019-04-17 PROCEDURE — 45385 COLONOSCOPY W/LESION REMOVAL: CPT | Performed by: INTERNAL MEDICINE

## 2019-04-17 PROCEDURE — 88305 TISSUE EXAM BY PATHOLOGIST: CPT | Performed by: INTERNAL MEDICINE

## 2019-04-17 PROCEDURE — 43239 EGD BIOPSY SINGLE/MULTIPLE: CPT | Performed by: INTERNAL MEDICINE

## 2019-04-17 PROCEDURE — 88305 TISSUE EXAM BY PATHOLOGIST: CPT | Performed by: PATHOLOGY

## 2019-04-17 RX ORDER — DEXTROSE AND SODIUM CHLORIDE 5; .45 G/100ML; G/100ML
30 INJECTION, SOLUTION INTRAVENOUS CONTINUOUS PRN
Status: DISCONTINUED | OUTPATIENT
Start: 2019-04-17 | End: 2019-04-17 | Stop reason: HOSPADM

## 2019-04-17 RX ORDER — LIDOCAINE HYDROCHLORIDE 20 MG/ML
INJECTION, SOLUTION INFILTRATION; PERINEURAL AS NEEDED
Status: DISCONTINUED | OUTPATIENT
Start: 2019-04-17 | End: 2019-04-17 | Stop reason: SURG

## 2019-04-17 RX ORDER — PROPOFOL 10 MG/ML
VIAL (ML) INTRAVENOUS AS NEEDED
Status: DISCONTINUED | OUTPATIENT
Start: 2019-04-17 | End: 2019-04-17 | Stop reason: SURG

## 2019-04-17 RX ORDER — ONDANSETRON 2 MG/ML
4 INJECTION INTRAMUSCULAR; INTRAVENOUS ONCE AS NEEDED
Status: DISCONTINUED | OUTPATIENT
Start: 2019-04-17 | End: 2019-04-17 | Stop reason: HOSPADM

## 2019-04-17 RX ADMIN — DEXTROSE AND SODIUM CHLORIDE 30 ML/HR: 5; 450 INJECTION, SOLUTION INTRAVENOUS at 10:19

## 2019-04-17 RX ADMIN — PROPOFOL 20 MG: 10 INJECTION, EMULSION INTRAVENOUS at 11:49

## 2019-04-17 RX ADMIN — LIDOCAINE HYDROCHLORIDE 100 MG: 20 INJECTION, SOLUTION INFILTRATION; PERINEURAL at 11:40

## 2019-04-17 RX ADMIN — PROPOFOL 20 MG: 10 INJECTION, EMULSION INTRAVENOUS at 11:55

## 2019-04-17 RX ADMIN — PROPOFOL 60 MG: 10 INJECTION, EMULSION INTRAVENOUS at 11:41

## 2019-04-17 RX ADMIN — PROPOFOL 20 MG: 10 INJECTION, EMULSION INTRAVENOUS at 11:53

## 2019-04-17 RX ADMIN — PROPOFOL 20 MG: 10 INJECTION, EMULSION INTRAVENOUS at 11:51

## 2019-04-17 RX ADMIN — PROPOFOL 20 MG: 10 INJECTION, EMULSION INTRAVENOUS at 11:45

## 2019-04-17 RX ADMIN — PROPOFOL 20 MG: 10 INJECTION, EMULSION INTRAVENOUS at 11:46

## 2019-04-17 RX ADMIN — PROPOFOL 20 MG: 10 INJECTION, EMULSION INTRAVENOUS at 11:52

## 2019-04-17 RX ADMIN — PROPOFOL 20 MG: 10 INJECTION, EMULSION INTRAVENOUS at 11:43

## 2019-04-17 RX ADMIN — PROPOFOL 20 MG: 10 INJECTION, EMULSION INTRAVENOUS at 11:47

## 2019-04-17 RX ADMIN — PROPOFOL 40 MG: 10 INJECTION, EMULSION INTRAVENOUS at 11:42

## 2019-04-17 RX ADMIN — PROPOFOL 20 MG: 10 INJECTION, EMULSION INTRAVENOUS at 11:50

## 2019-04-17 RX ADMIN — PROPOFOL 100 MG: 10 INJECTION, EMULSION INTRAVENOUS at 11:40

## 2019-04-17 NOTE — ANESTHESIA POSTPROCEDURE EVALUATION
Patient: Janell Suazo    Procedure Summary     Date:  04/17/19 Room / Location:  [facility] ENDOSCOPY 3 / [facility] ENDOSCOPY    Anesthesia Start:  1137 Anesthesia Stop:  1200    Procedures:       ESOPHAGOGASTRODUODENOSCOPY (N/A )      COLONOSCOPY (N/A ) Diagnosis:       Change in bowel habits      Gastroesophageal reflux disease, esophagitis presence not specified      Blood in stool      Generalized abdominal pain      (Change in bowel habits [R19.4])      (Gastroesophageal reflux disease, esophagitis presence not specified [K21.9])      (Blood in stool [K92.1])      (Generalized abdominal pain [R10.84])    Surgeon:  Gian Todd MD Provider:  Willie Hernandez CRNA    Anesthesia Type:  MAC ASA Status:  3          Anesthesia Type: MAC  Last vitals  BP   151/59 (04/17/19 1006)   Temp   97.3 °F (36.3 °C) (04/17/19 1006)   Pulse   88 (04/17/19 1006)   Resp   20 (04/17/19 1006)     SpO2   96 % (04/17/19 1006)     Post Anesthesia Care and Evaluation    Patient location during evaluation: PACU  Level of consciousness: awake  Pain score: 0  Pain management: adequate  Airway patency: patent  Anesthetic complications: No anesthetic complications  PONV Status: none  Cardiovascular status: acceptable and hemodynamically stable  Respiratory status: acceptable and spontaneous ventilation  Hydration status: acceptable

## 2019-04-17 NOTE — ANESTHESIA PREPROCEDURE EVALUATION
Anesthesia Evaluation     Patient summary reviewed and Nursing notes reviewed   NPO Solid Status: > 8 hours  NPO Liquid Status: > 2 hours           Airway   Mallampati: II  TM distance: >3 FB  Dental      Pulmonary - normal exam   Cardiovascular - normal exam    ECG reviewed        Neuro/Psych  (+) psychiatric history Anxiety,     GI/Hepatic/Renal/Endo    (+) obesity, morbid obesity, GERD,      Musculoskeletal     Abdominal   (+) obese,    Substance History      OB/GYN          Other                      Anesthesia Plan    ASA 3     MAC     intravenous induction   Anesthetic plan, all risks, benefits, and alternatives have been provided, discussed and informed consent has been obtained with: patient.

## 2019-04-18 LAB
LAB AP CASE REPORT: NORMAL
PATH REPORT.FINAL DX SPEC: NORMAL
PATH REPORT.GROSS SPEC: NORMAL

## 2019-04-30 RX ORDER — PANTOPRAZOLE SODIUM 40 MG/1
40 TABLET, DELAYED RELEASE ORAL DAILY
Qty: 30 TABLET | Refills: 5 | Status: SHIPPED | OUTPATIENT
Start: 2019-04-30 | End: 2023-02-21

## 2019-08-11 ENCOUNTER — APPOINTMENT (OUTPATIENT)
Dept: GENERAL RADIOLOGY | Facility: HOSPITAL | Age: 39
End: 2019-08-11

## 2019-08-11 ENCOUNTER — APPOINTMENT (OUTPATIENT)
Dept: ULTRASOUND IMAGING | Facility: HOSPITAL | Age: 39
End: 2019-08-11

## 2019-08-11 ENCOUNTER — HOSPITAL ENCOUNTER (EMERGENCY)
Facility: HOSPITAL | Age: 39
Discharge: HOME | End: 2019-08-11
Admitting: EMERGENCY MEDICINE

## 2019-08-11 VITALS
HEIGHT: 71 IN | OXYGEN SATURATION: 96 % | SYSTOLIC BLOOD PRESSURE: 113 MMHG | RESPIRATION RATE: 20 BRPM | BODY MASS INDEX: 41.02 KG/M2 | TEMPERATURE: 97.7 F | HEART RATE: 79 BPM | DIASTOLIC BLOOD PRESSURE: 55 MMHG | WEIGHT: 293 LBS

## 2019-08-11 DIAGNOSIS — R00.2 PALPITATION: Primary | ICD-10-CM

## 2019-08-11 DIAGNOSIS — F41.9 ANXIETY: ICD-10-CM

## 2019-08-11 LAB
ALBUMIN SERPL-MCNC: 4.5 G/DL (ref 3.5–5.2)
ALBUMIN/GLOB SERPL: 1.6 G/DL
ALP SERPL-CCNC: 68 U/L (ref 39–117)
ALT SERPL W P-5'-P-CCNC: 16 U/L (ref 1–33)
ANION GAP SERPL CALCULATED.3IONS-SCNC: 11 MMOL/L (ref 5–15)
AST SERPL-CCNC: 16 U/L (ref 1–32)
BASOPHILS # BLD AUTO: 0.05 10*3/MM3 (ref 0–0.2)
BASOPHILS NFR BLD AUTO: 0.5 % (ref 0–1.5)
BILIRUB SERPL-MCNC: 0.2 MG/DL (ref 0.2–1.2)
BUN BLD-MCNC: 12 MG/DL (ref 6–20)
BUN/CREAT SERPL: 12 (ref 7–25)
CALCIUM SPEC-SCNC: 8.8 MG/DL (ref 8.6–10.5)
CHLORIDE SERPL-SCNC: 104 MMOL/L (ref 98–107)
CO2 SERPL-SCNC: 24 MMOL/L (ref 22–29)
CREAT BLD-MCNC: 1 MG/DL (ref 0.57–1)
DEPRECATED RDW RBC AUTO: 39.9 FL (ref 37–54)
EOSINOPHIL # BLD AUTO: 0.25 10*3/MM3 (ref 0–0.4)
EOSINOPHIL NFR BLD AUTO: 2.7 % (ref 0.3–6.2)
ERYTHROCYTE [DISTWIDTH] IN BLOOD BY AUTOMATED COUNT: 13.1 % (ref 12.3–15.4)
GFR SERPL CREATININE-BSD FRML MDRD: 62 ML/MIN/1.73
GLOBULIN UR ELPH-MCNC: 2.9 GM/DL
GLUCOSE BLD-MCNC: 92 MG/DL (ref 65–99)
HCT VFR BLD AUTO: 41.7 % (ref 34–46.6)
HGB BLD-MCNC: 13.9 G/DL (ref 12–15.9)
HOLD SPECIMEN: NORMAL
IMM GRANULOCYTES # BLD AUTO: 0.05 10*3/MM3 (ref 0–0.05)
IMM GRANULOCYTES NFR BLD AUTO: 0.5 % (ref 0–0.5)
LIPASE SERPL-CCNC: 31 U/L (ref 13–60)
LYMPHOCYTES # BLD AUTO: 3.68 10*3/MM3 (ref 0.7–3.1)
LYMPHOCYTES NFR BLD AUTO: 39.2 % (ref 19.6–45.3)
MCH RBC QN AUTO: 28.4 PG (ref 26.6–33)
MCHC RBC AUTO-ENTMCNC: 33.3 G/DL (ref 31.5–35.7)
MCV RBC AUTO: 85.3 FL (ref 79–97)
MONOCYTES # BLD AUTO: 0.93 10*3/MM3 (ref 0.1–0.9)
MONOCYTES NFR BLD AUTO: 9.9 % (ref 5–12)
NEUTROPHILS # BLD AUTO: 4.42 10*3/MM3 (ref 1.7–7)
NEUTROPHILS NFR BLD AUTO: 47.2 % (ref 42.7–76)
NRBC BLD AUTO-RTO: 0 /100 WBC (ref 0–0.2)
PLATELET # BLD AUTO: 264 10*3/MM3 (ref 140–450)
PMV BLD AUTO: 10.9 FL (ref 6–12)
POTASSIUM BLD-SCNC: 4.1 MMOL/L (ref 3.5–5.2)
PROT SERPL-MCNC: 7.4 G/DL (ref 6–8.5)
RBC # BLD AUTO: 4.89 10*6/MM3 (ref 3.77–5.28)
SODIUM BLD-SCNC: 139 MMOL/L (ref 136–145)
T4 FREE SERPL-MCNC: 1.2 NG/DL (ref 0.93–1.7)
TROPONIN T SERPL-MCNC: <0.01 NG/ML (ref 0–0.03)
TSH SERPL DL<=0.05 MIU/L-ACNC: 3.04 MIU/ML (ref 0.27–4.2)
WBC NRBC COR # BLD: 9.38 10*3/MM3 (ref 3.4–10.8)
WHOLE BLOOD HOLD SPECIMEN: NORMAL

## 2019-08-11 PROCEDURE — 76705 ECHO EXAM OF ABDOMEN: CPT

## 2019-08-11 PROCEDURE — 84484 ASSAY OF TROPONIN QUANT: CPT | Performed by: STUDENT IN AN ORGANIZED HEALTH CARE EDUCATION/TRAINING PROGRAM

## 2019-08-11 PROCEDURE — 84439 ASSAY OF FREE THYROXINE: CPT | Performed by: STUDENT IN AN ORGANIZED HEALTH CARE EDUCATION/TRAINING PROGRAM

## 2019-08-11 PROCEDURE — 93010 ELECTROCARDIOGRAM REPORT: CPT | Performed by: INTERNAL MEDICINE

## 2019-08-11 PROCEDURE — 99283 EMERGENCY DEPT VISIT LOW MDM: CPT

## 2019-08-11 PROCEDURE — 93005 ELECTROCARDIOGRAM TRACING: CPT

## 2019-08-11 PROCEDURE — 84443 ASSAY THYROID STIM HORMONE: CPT | Performed by: STUDENT IN AN ORGANIZED HEALTH CARE EDUCATION/TRAINING PROGRAM

## 2019-08-11 PROCEDURE — 71046 X-RAY EXAM CHEST 2 VIEWS: CPT

## 2019-08-11 PROCEDURE — 80053 COMPREHEN METABOLIC PANEL: CPT | Performed by: STUDENT IN AN ORGANIZED HEALTH CARE EDUCATION/TRAINING PROGRAM

## 2019-08-11 PROCEDURE — 83690 ASSAY OF LIPASE: CPT | Performed by: STUDENT IN AN ORGANIZED HEALTH CARE EDUCATION/TRAINING PROGRAM

## 2019-08-11 PROCEDURE — 85025 COMPLETE CBC W/AUTO DIFF WBC: CPT | Performed by: STUDENT IN AN ORGANIZED HEALTH CARE EDUCATION/TRAINING PROGRAM

## 2019-08-11 RX ORDER — HYDROXYZINE PAMOATE 25 MG/1
50 CAPSULE ORAL ONCE
Status: COMPLETED | OUTPATIENT
Start: 2019-08-11 | End: 2019-08-11

## 2019-08-11 RX ADMIN — HYDROXYZINE PAMOATE 50 MG: 25 CAPSULE ORAL at 11:48

## 2019-08-11 NOTE — ED PROVIDER NOTES
Subjective     Palpitations   Palpitations quality:  Fast  Onset quality:  Sudden  Duration:  2 hours  Timing:  Constant  Progression:  Unchanged  Chronicity:  New  Context: not anxiety, not blood loss, not bronchodilators, not caffeine, not dehydration, not exercise, not hyperventilation, not illicit drugs and not stimulant use    Relieved by:  None tried  Worsened by:  Nothing  Ineffective treatments:  None tried  Associated symptoms: back pain (Mid scapular)    Associated symptoms: no chest pain, no cough, no diaphoresis, no dizziness, no nausea, no shortness of breath and no vomiting    Risk factors: no diabetes mellitus, no heart disease, no hx of atrial fibrillation, no hx of DVT, no hx of PE, no hypercoagulable state and no hyperthyroidism        Review of Systems   Constitutional: Negative for chills, diaphoresis, fatigue and fever.   HENT: Negative for congestion and rhinorrhea.    Eyes: Negative for discharge and redness.   Respiratory: Negative for cough, chest tightness, shortness of breath and wheezing.    Cardiovascular: Positive for palpitations. Negative for chest pain and leg swelling.   Gastrointestinal: Negative for abdominal pain, constipation, diarrhea, nausea and vomiting.   Genitourinary: Negative for dysuria and flank pain.   Musculoskeletal: Positive for back pain (Mid scapular).   Skin: Negative for color change and rash.   Neurological: Negative for dizziness and headaches.   Psychiatric/Behavioral: Negative for agitation and confusion. The patient is not nervous/anxious.        Past Medical History:   Diagnosis Date   • Acute otitis media with effusion    • Allergic rhinitis    • Amenorrhea    • Anxiety state    • Diarrhea    • Encounter for gynecological examination    • Encounter for gynecological examination without abnormal finding    • Epigastric pain     Burning epigastric pain    • Fatigue    • Gastroesophageal reflux disease    • Hip pain    • Knee pain    • Laboratory examination  ordered as part of a routine general medical examination in pediatric patient     General examination of patient    • Low back pain    • Lumbosacral strain    • Metabolic syndrome X    • Morbid obesity (CMS/[facility])     BMI 40.4    • MRSA (methicillin resistant Staphylococcus aureus) 2011    abscess on face   • Nausea and vomiting    • Obesity     Hyperinsulinar obesity    • Otitis media    • Pain in limb     right limb   • Rhinitis        No Known Allergies    Past Surgical History:   Procedure Laterality Date   • COLONOSCOPY N/A 4/17/2019    Procedure: COLONOSCOPY;  Surgeon: Gian Todd MD;  Location: [facility] ENDOSCOPY;  Service: Gastroenterology   • DIAGNOSTIC LAPAROSCOPY  06/21/2007    Exam under anesthesia, diagnostic laparoscopy, diagnostic hysteroscopy, fractional dilatation and curetttage   • ENDOSCOPY N/A 4/17/2019    Procedure: ESOPHAGOGASTRODUODENOSCOPY;  Surgeon: Gian Todd MD;  Location: [facility] ENDOSCOPY;  Service: Gastroenterology   • INJECTION OF MEDICATION  11/05/2012    Kenalog (1)  -  W. Valdes   • PAP SMEAR  08/08/2011    Negative   • SALPINGO OOPHORECTOMY  2004    Left   [place], [place]   • TUBAL ABDOMINAL LIGATION  2010    [place], [place]       Family History   Problem Relation Age of Onset   • Colon cancer Other         Colorectal cancer   • Diabetes Other    • Hypertension Other    • Thyroid disease Other        Social History     Socioeconomic History   • Marital status: Single     Spouse name: Not on file   • Number of children: Not on file   • Years of education: Not on file   • Highest education level: Not on file   Tobacco Use   • Smoking status: Never Smoker   • Smokeless tobacco: Never Used   Substance and Sexual Activity   • Alcohol use: No   • Drug use: No   • Sexual activity: Defer           Objective    Vitals:    08/11/19 1114 08/11/19 1229   BP: 142/83 159/73   BP Location: Right arm    Patient Position: Sitting    Pulse: 105    Resp: 20    Temp: 97.7 °F (36.5 °C)    TempSrc:  "Oral    SpO2: 98%    Weight: (!) 139 kg (306 lb)    Height: 180.3 cm (71\")      Physical Exam   Constitutional: She is oriented to person, place, and time. She appears well-developed and well-nourished. She is active.  Non-toxic appearance. She does not have a sickly appearance. She does not appear ill. No distress.   HENT:   Head: Normocephalic.   Right Ear: External ear normal.   Left Ear: External ear normal.   Nose: No mucosal edema or rhinorrhea.   Mouth/Throat: Uvula is midline and mucous membranes are normal.   Eyes: Conjunctivae are normal. No scleral icterus.   Cardiovascular: Intact distal pulses.   Pulmonary/Chest: Effort normal and breath sounds normal. No accessory muscle usage. No respiratory distress. She has no decreased breath sounds. She has no wheezes. She exhibits no tenderness.   Abdominal: Soft. Bowel sounds are normal. There is tenderness (deep palpation) in the right upper quadrant. There is no rigidity, no rebound, no guarding and no CVA tenderness.   Neurological: She is alert and oriented to person, place, and time. She is not disoriented. No cranial nerve deficit (grossly intact). GCS eye subscore is 4. GCS verbal subscore is 5. GCS motor subscore is 6.   Skin: Skin is warm and dry. Capillary refill takes less than 2 seconds. She is not diaphoretic.   Psychiatric: Her mood appears anxious.   Nursing note and vitals reviewed.      ECG 12 Lead    Date/Time: 8/11/2019 1:01 PM  Performed by: Elijah Bruner MD  Authorized by: Elijah Bruner MD   Interpreted by physician  Rhythm: sinus rhythm  Rate: normal  QRS axis: normal  ST Segments: ST segments normal  T Waves: T waves normal  Clinical impression: normal ECG           ED Course      Results for orders placed or performed during the hospital encounter of 08/11/19   Troponin   Result Value Ref Range    Troponin T <0.010 0.000 - 0.030 ng/mL   CBC Auto Differential   Result Value Ref Range    WBC 9.38 3.40 - 10.80 10*3/mm3    RBC 4.89 " 3.77 - 5.28 10*6/mm3    Hemoglobin 13.9 12.0 - 15.9 g/dL    Hematocrit 41.7 34.0 - 46.6 %    MCV 85.3 79.0 - 97.0 fL    MCH 28.4 26.6 - 33.0 pg    MCHC 33.3 31.5 - 35.7 g/dL    RDW 13.1 12.3 - 15.4 %    RDW-SD 39.9 37.0 - 54.0 fl    MPV 10.9 6.0 - 12.0 fL    Platelets 264 140 - 450 10*3/mm3    Neutrophil % 47.2 42.7 - 76.0 %    Lymphocyte % 39.2 19.6 - 45.3 %    Monocyte % 9.9 5.0 - 12.0 %    Eosinophil % 2.7 0.3 - 6.2 %    Basophil % 0.5 0.0 - 1.5 %    Immature Grans % 0.5 0.0 - 0.5 %    Neutrophils, Absolute 4.42 1.70 - 7.00 10*3/mm3    Lymphocytes, Absolute 3.68 (H) 0.70 - 3.10 10*3/mm3    Monocytes, Absolute 0.93 (H) 0.10 - 0.90 10*3/mm3    Eosinophils, Absolute 0.25 0.00 - 0.40 10*3/mm3    Basophils, Absolute 0.05 0.00 - 0.20 10*3/mm3    Immature Grans, Absolute 0.05 0.00 - 0.05 10*3/mm3    nRBC 0.0 0.0 - 0.2 /100 WBC   Comprehensive Metabolic Panel   Result Value Ref Range    Glucose 92 65 - 99 mg/dL    BUN 12 6 - 20 mg/dL    Creatinine 1.00 0.57 - 1.00 mg/dL    Sodium 139 136 - 145 mmol/L    Potassium 4.1 3.5 - 5.2 mmol/L    Chloride 104 98 - 107 mmol/L    CO2 24.0 22.0 - 29.0 mmol/L    Calcium 8.8 8.6 - 10.5 mg/dL    Total Protein 7.4 6.0 - 8.5 g/dL    Albumin 4.50 3.50 - 5.20 g/dL    ALT (SGPT) 16 1 - 33 U/L    AST (SGOT) 16 1 - 32 U/L    Alkaline Phosphatase 68 39 - 117 U/L    Total Bilirubin 0.2 0.2 - 1.2 mg/dL    eGFR Non African Amer 62 >60 mL/min/1.73    Globulin 2.9 gm/dL    A/G Ratio 1.6 g/dL    BUN/Creatinine Ratio 12.0 7.0 - 25.0    Anion Gap 11.0 5.0 - 15.0 mmol/L   Lipase   Result Value Ref Range    Lipase 31 13 - 60 U/L   TSH   Result Value Ref Range    TSH 3.040 0.270 - 4.200 mIU/mL   T4, Free   Result Value Ref Range    Free T4 1.20 0.93 - 1.70 ng/dL   Light Blue Top   Result Value Ref Range    Extra Tube hold for add-on    Gold Top - SST   Result Value Ref Range    Extra Tube Hold for add-ons.      US Abdomen Limited   Final Result   CONCLUSION:   Contracted gallbladder.   Fatty infiltration  of the liver.      [id number]      Electronically signed by:  Rony Velasquez MD  8/11/2019 12:50 PM   CDT Workstation: [email or web address]      XR Chest PA & Lateral   Final Result   CONCLUSION:   No Acute Disease      [id number]      Electronically signed by:  Rony Velasquez MD  8/11/2019 12:35 PM   CDT Workstation: [email or web address]            HEART Score (for prediction of 6-week risk of major adverse cardiac event) reviewed and/or performed as part of the patient evaluation and treatment planning process.  The result associated with this review/performance is: 1       MDM  Number of Diagnoses or Management Options  Anxiety: new and requires workup  Palpitation: new and requires workup  Diagnosis management comments: Patient placed in bed 20  and evaluated by me.  Vitals are hemodynamically stable, afebrile,tachycardic, satting well on room air.  Patient evaluated for her palpitations and\right upper quadrant pain.  Chest x-ray shows no acute cardiopulmonary processes.  Right upper quadrant ultrasound negative for stones or cholecystitis.  Labs obtained and are grossly unremarkable.  Troponin negative x1.  Heart score of 1.  EKG normal sinus rhythm.  Patient initially tachycardic, but after receiving Vistaril patient reported feeling better and heart rate decreased into the high 60s.  On reevaluation, patient is alert and resting comfortably. I discussed the results of the emergency department evaluation with the patient.  I recommended primary care follow-up.  I advised the patient to return to the emergency department if their symptoms change or worsen.       Amount and/or Complexity of Data Reviewed  Clinical lab tests: reviewed and ordered  Tests in the radiology section of CPT®: ordered and reviewed  Tests in the medicine section of CPT®: ordered and reviewed  Decide to obtain previous medical records or to obtain history from someone other than the patient: yes  Review and summarize past medical records: yes  Independent  visualization of images, tracings, or specimens: yes    Risk of Complications, Morbidity, and/or Mortality  Presenting problems: moderate  Diagnostic procedures: moderate  Management options: moderate    Patient Progress  Patient progress: improved        Final diagnoses:   Palpitation   Anxiety            Elijah Bruner MD  Resident  08/11/19 8066

## 2019-08-23 ENCOUNTER — OFFICE VISIT (OUTPATIENT)
Dept: OBSTETRICS AND GYNECOLOGY | Facility: CLINIC | Age: 39
End: 2019-08-23

## 2019-08-23 ENCOUNTER — APPOINTMENT (OUTPATIENT)
Dept: LAB | Facility: HOSPITAL | Age: 39
End: 2019-08-23

## 2019-08-23 VITALS
SYSTOLIC BLOOD PRESSURE: 116 MMHG | BODY MASS INDEX: 41.02 KG/M2 | HEIGHT: 71 IN | DIASTOLIC BLOOD PRESSURE: 74 MMHG | WEIGHT: 293 LBS

## 2019-08-23 DIAGNOSIS — R93.89 THICKENED ENDOMETRIUM: ICD-10-CM

## 2019-08-23 DIAGNOSIS — N93.9 ABNORMAL UTERINE BLEEDING (AUB): ICD-10-CM

## 2019-08-23 DIAGNOSIS — Z01.419 ENCOUNTER FOR GYNECOLOGICAL EXAMINATION WITHOUT ABNORMAL FINDING: Primary | ICD-10-CM

## 2019-08-23 LAB
DEPRECATED RDW RBC AUTO: 38.5 FL (ref 37–54)
ERYTHROCYTE [DISTWIDTH] IN BLOOD BY AUTOMATED COUNT: 12.6 % (ref 12.3–15.4)
HBA1C MFR BLD: 5.4 % (ref 4.8–5.6)
HCT VFR BLD AUTO: 44.4 % (ref 34–46.6)
HGB BLD-MCNC: 14.6 G/DL (ref 12–15.9)
MCH RBC QN AUTO: 27.8 PG (ref 26.6–33)
MCHC RBC AUTO-ENTMCNC: 32.9 G/DL (ref 31.5–35.7)
MCV RBC AUTO: 84.6 FL (ref 79–97)
PLATELET # BLD AUTO: 338 10*3/MM3 (ref 140–450)
PMV BLD AUTO: 10.7 FL (ref 6–12)
RBC # BLD AUTO: 5.25 10*6/MM3 (ref 3.77–5.28)
WBC NRBC COR # BLD: 6.67 10*3/MM3 (ref 3.4–10.8)

## 2019-08-23 PROCEDURE — 87624 HPV HI-RISK TYP POOLED RSLT: CPT | Performed by: OBSTETRICS & GYNECOLOGY

## 2019-08-23 PROCEDURE — 99395 PREV VISIT EST AGE 18-39: CPT | Performed by: OBSTETRICS & GYNECOLOGY

## 2019-08-23 PROCEDURE — 83036 HEMOGLOBIN GLYCOSYLATED A1C: CPT | Performed by: OBSTETRICS & GYNECOLOGY

## 2019-08-23 PROCEDURE — 85027 COMPLETE CBC AUTOMATED: CPT | Performed by: OBSTETRICS & GYNECOLOGY

## 2019-08-23 PROCEDURE — 84403 ASSAY OF TOTAL TESTOSTERONE: CPT | Performed by: OBSTETRICS & GYNECOLOGY

## 2019-08-23 PROCEDURE — 84402 ASSAY OF FREE TESTOSTERONE: CPT | Performed by: OBSTETRICS & GYNECOLOGY

## 2019-08-23 PROCEDURE — G0123 SCREEN CERV/VAG THIN LAYER: HCPCS | Performed by: OBSTETRICS & GYNECOLOGY

## 2019-08-23 NOTE — PROGRESS NOTES
Janell Suazo is a 39 y.o. y/o female.     Chief Complaint: For Pap smear and also having bleeding    HPI:   39 y.o. No obstetric history on file..  Patient's last menstrual period was 08/12/2019 (within days)..  She is here for Pap smear says she is up on her immunization wears her seatbelt other with risk reduction strategies reviewed Presents with complaints of menorrhagia for about 3 years.  The bleeding is from the vagina.  At its heaviest is about 10 maxipads a day.  It is irregularly irregular.  It is made worse by being on her feet  It is associated with dysmenorrhea and pelvic pain that is central, crampy radiates to the back and made better by nonsteroidal anti-inflammatories and is progressed over time.          Review of Systems   ROS:  CNS: No history of brain malignancy.  HEENT: No history of throat cancer.  Eye: No history of retinal cancer.  Pulmonary: No history of lung cancer.                                                                                 Cardiovascular: No history of cardiac tumors.  Gastrointestinal: No history of small bowel tumors.  Renal: No history of kidney malignancy.  Musculoskeletal: No history of smooth muscle tumors.  Lymphatics: No history of Hodgkin's disease.  Endocrine: No history of thyroid malignancy.    The following portions of the patient's history were reviewed and updated as appropriate: allergies, current medications, past family history, past medical history, past social history, past surgical history and problem list.    No Known Allergies     Outpatient Medications Prior to Visit   Medication Sig Dispense Refill   • pantoprazole (PROTONIX) 40 MG EC tablet Take 1 tablet by mouth Daily. 30 tablet 5     No facility-administered medications prior to visit.         The patient has a family history of   Family History   Problem Relation Age of Onset   • Colon cancer Other         Colorectal cancer   • Diabetes Other    • Hypertension Other    •  Thyroid disease Other         Past Medical History:   Diagnosis Date   • Acute otitis media with effusion    • Allergic rhinitis    • Amenorrhea    • Anxiety state    • Diarrhea    • Encounter for gynecological examination    • Encounter for gynecological examination without abnormal finding    • Epigastric pain     Burning epigastric pain    • Fatigue    • Gastroesophageal reflux disease    • Hip pain    • Knee pain    • Laboratory examination ordered as part of a routine general medical examination in pediatric patient     General examination of patient    • Low back pain    • Lumbosacral strain    • Metabolic syndrome X    • Morbid obesity (CMS/[facility])     BMI 40.4    • MRSA (methicillin resistant Staphylococcus aureus) 2011    abscess on face   • Nausea and vomiting    • Obesity     Hyperinsulinar obesity    • Otitis media    • Pain in limb     right limb   • Rhinitis         OB History     No data available           Social History     Socioeconomic History   • Marital status: Single     Spouse name: Not on file   • Number of children: Not on file   • Years of education: Not on file   • Highest education level: Not on file   Tobacco Use   • Smoking status: Never Smoker   • Smokeless tobacco: Never Used   Substance and Sexual Activity   • Alcohol use: No   • Drug use: No   • Sexual activity: Defer        Past Surgical History:   Procedure Laterality Date   • COLONOSCOPY N/A 4/17/2019    Procedure: COLONOSCOPY;  Surgeon: Gian Todd MD;  Location: [facility] ENDOSCOPY;  Service: Gastroenterology   • DIAGNOSTIC LAPAROSCOPY  06/21/2007    Exam under anesthesia, diagnostic laparoscopy, diagnostic hysteroscopy, fractional dilatation and curetttage   • ENDOSCOPY N/A 4/17/2019    Procedure: ESOPHAGOGASTRODUODENOSCOPY;  Surgeon: Gian Todd MD;  Location: [facility] ENDOSCOPY;  Service: Gastroenterology   • INJECTION OF MEDICATION  11/05/2012    Kenalog (1)  [facility] Valdes   • PAP SMEAR  08/08/2011    Negative   •  "SALPINGO OOPHORECTOMY  2004    Left   [facility]   • TUBAL ABDOMINAL LIGATION  2010    [facility]        Patient Active Problem List   Diagnosis   • Insulin resistance   • Obesity, Class III, BMI 40-49.9 (morbid obesity) (CMS/HCC)   • Change in bowel habits   • Gastroesophageal reflux disease   • Blood in stool   • Generalized abdominal pain        Documented Vitals    08/23/19 0838   BP: 116/74   Weight: 135 kg (297 lb)   Height: 180.3 cm (71\")   PainSc: 0-No pain        Body mass index is 41.42 kg/m².    Physical Exam  Constitutional: Appears to be in no acute distress; Eyes: sclera normal; Endocrine system: thyroid palpate is normal; Pulmonary system: lungs clear; Cardiovascular system: heart regular rate and rhythm; Gastrointestinal system: abdomen soft nontender, active bowel sounds; Urologic system: CVA negative; Psychiatric: appropriate insight; Neurologic: gait within normal limits female genital system external genitalia normal vagina normal cervix no gross lesion Pap smear performed bimanual examination difficult secondary to habitus    Laboratory Data:   Lab Results - Last 18 Months   Lab Units 08/11/19  1158 03/20/19  0925   GLUCOSE mg/dL 92 99   BUN mg/dL 12 17   CREATININE mg/dL 1.00 0.89   SODIUM mmol/L 139 136*   POTASSIUM mmol/L 4.1 3.9   CHLORIDE mmol/L 104 99   CO2 mmol/L 24.0 28.0   CALCIUM mg/dL 8.8 9.1   TOTAL PROTEIN g/dL 7.4 7.5   ALBUMIN g/dL 4.50 4.30   ALT (SGPT) U/L 16 24   AST (SGOT) U/L 16 24   ALK PHOS U/L 68 71   BILIRUBIN mg/dL 0.2 0.4   EGFR IF NONAFRICN AM mL/min/1.73 62 71   GLOBULIN gm/dL 2.9 3.2   A/G RATIO g/dL 1.6 1.3   BUN / CREAT RATIO  12.0 19.1   ANION GAP mmol/L 11.0 9.0     Lab Results - Last 18 Months   Lab Units 08/11/19  1135 03/20/19  0925   WBC 10*3/mm3 9.38 6.59   RBC 10*6/mm3 4.89 4.97   HEMOGLOBIN g/dL 13.9 13.9   HEMATOCRIT % 41.7 41.8   MCV fL 85.3 84.1   MCH pg 28.4 28.0   MCHC g/dL 33.3 33.3   RDW % 13.1 13.1   RDW-SD fl 39.9 39.8   MPV fL 10.9 9.7 "   PLATELETS 10*3/mm3 264 305     No results for input(s): HCGQUAL in the last 62508 hours.    Assessment        Diagnosis Plan   1. Encounter for gynecological examination without abnormal finding  Liquid-based Pap Smear, Screening   2. Abnormal uterine bleeding (AUB)  Hemoglobin A1c    Testosterone (Free & Total), LC / MS    CBC (No Diff)    US Non-ob Transvaginal         Plan       She has an extensive surgical history as far as gynecology goes she has as I understand it a left salpingo-oophorectomy and surgery on the right adnexa were going to try and get her records.  Ultrasound done today shows a thickened endometrium and I reviewed the films with her.  I recommended hysteroscopy D&C to rule out polyps and hyperplasia or malignancy we will plan to do an office          This document has been electronically signed by Staci Murphy MA on August 23, 2019 12:12 PM    Please note that portions of this note were completed with a voice recognition program.

## 2019-08-25 PROBLEM — N93.9 ABNORMAL UTERINE BLEEDING (AUB): Status: ACTIVE | Noted: 2019-08-25

## 2019-08-25 PROBLEM — Z01.419 ENCOUNTER FOR GYNECOLOGICAL EXAMINATION WITHOUT ABNORMAL FINDING: Status: ACTIVE | Noted: 2019-08-25

## 2019-08-25 PROBLEM — R93.89 THICKENED ENDOMETRIUM: Status: ACTIVE | Noted: 2019-08-25

## 2019-08-26 ENCOUNTER — TELEPHONE (OUTPATIENT)
Dept: OBSTETRICS AND GYNECOLOGY | Facility: CLINIC | Age: 39
End: 2019-08-26

## 2019-08-26 NOTE — TELEPHONE ENCOUNTER
Patient called wanting her test results. I told her that her cbc and A1C were both normal. I also told her that her Pap Smear results and her testosterone results were not available yet. I told her that I would call her when those came in. Patient verbalized understanding.

## 2019-08-27 LAB
TESTOST FREE SERPL-MCNC: 2.4 PG/ML (ref 0–4.2)
TESTOST SERPL-MCNC: 28.5 NG/DL (ref 10–55)

## 2019-08-28 LAB
GEN CATEG CVX/VAG CYTO-IMP: NORMAL
LAB AP CASE REPORT: NORMAL
LAB AP GYN ADDITIONAL INFORMATION: NORMAL
PATH INTERP SPEC-IMP: NORMAL
STAT OF ADQ CVX/VAG CYTO-IMP: NORMAL

## 2019-08-30 LAB — HPV I/H RISK 4 DNA CVX QL PROBE+SIG AMP: NEGATIVE

## 2019-09-06 ENCOUNTER — PROCEDURE VISIT (OUTPATIENT)
Dept: OBSTETRICS AND GYNECOLOGY | Facility: CLINIC | Age: 39
End: 2019-09-06

## 2019-09-06 VITALS
WEIGHT: 292.6 LBS | SYSTOLIC BLOOD PRESSURE: 120 MMHG | DIASTOLIC BLOOD PRESSURE: 80 MMHG | BODY MASS INDEX: 40.96 KG/M2 | HEIGHT: 71 IN

## 2019-09-06 DIAGNOSIS — N93.9 ABNORMAL UTERINE BLEEDING (AUB): ICD-10-CM

## 2019-09-06 DIAGNOSIS — B35.4 TINEA CORPORIS: ICD-10-CM

## 2019-09-06 DIAGNOSIS — R93.89 THICKENED ENDOMETRIUM: Primary | ICD-10-CM

## 2019-09-06 PROCEDURE — 99213 OFFICE O/P EST LOW 20 MIN: CPT | Performed by: OBSTETRICS & GYNECOLOGY

## 2019-09-06 PROCEDURE — 88305 TISSUE EXAM BY PATHOLOGIST: CPT | Performed by: PATHOLOGY

## 2019-09-06 PROCEDURE — 88305 TISSUE EXAM BY PATHOLOGIST: CPT | Performed by: OBSTETRICS & GYNECOLOGY

## 2019-09-06 PROCEDURE — 58558 HYSTEROSCOPY BIOPSY: CPT | Performed by: OBSTETRICS & GYNECOLOGY

## 2019-09-06 RX ORDER — CLOTRIMAZOLE 1 %
CREAM WITH APPLICATOR VAGINAL 2 TIMES DAILY
Qty: 45 G | Refills: 0 | Status: SHIPPED | OUTPATIENT
Start: 2019-09-06 | End: 2019-09-06

## 2019-09-06 RX ORDER — CLOTRIMAZOLE 1 G/ML
SOLUTION TOPICAL 2 TIMES DAILY
Qty: 60 ML | Refills: 5 | OUTPATIENT
Start: 2019-09-06 | End: 2021-06-12

## 2019-09-06 RX ORDER — IBUPROFEN 200 MG
200 TABLET ORAL EVERY 6 HOURS PRN
COMMUNITY

## 2019-09-06 NOTE — PROGRESS NOTES
Janell Suazo is a 39 y.o. y/o female.     Chief Complaint: Patient with menorrhagia and pelvic pain    HPI:   39 y.o. No obstetric history on file..  Patient's last menstrual period was 08/12/2019 (within days)..  Patient menorrhagia pelvic pain somewhat enlarged uterus after reviewing the risks benefits alternatives were gone plan for hysteroscopy with the endo-see an endometrial biopsy in office risk reviewed. .tsn          Review of Systems   ROS:  CNS: No history of brain malignancy.  HEENT: No history of throat cancer.  Eye: No history of retinal cancer.  Pulmonary: No history of lung cancer.                                                                                 Cardiovascular: No history of cardiac tumors.  Gastrointestinal: No history of small bowel tumors.  Renal: No history of kidney malignancy.  Musculoskeletal: No history of smooth muscle tumors.  Lymphatics: No history of Hodgkin's disease.  Endocrine: No history of thyroid malignancy.    The following portions of the patient's history were reviewed and updated as appropriate: allergies, current medications, past family history, past medical history, past social history, past surgical history and problem list.    No Known Allergies     Outpatient Medications Prior to Visit   Medication Sig Dispense Refill   • ibuprofen (ADVIL,MOTRIN) 200 MG tablet Take 200 mg by mouth Every 6 (Six) Hours As Needed for Mild Pain .     • pantoprazole (PROTONIX) 40 MG EC tablet Take 1 tablet by mouth Daily. 30 tablet 5     No facility-administered medications prior to visit.         The patient has a family history of   Family History   Problem Relation Age of Onset   • Colon cancer Other         Colorectal cancer   • Diabetes Other    • Hypertension Other    • Thyroid disease Other         Past Medical History:   Diagnosis Date   • Acute otitis media with effusion    • Allergic rhinitis    • Amenorrhea    • Anxiety state    • Diarrhea    • Encounter  for gynecological examination    • Encounter for gynecological examination without abnormal finding    • Epigastric pain     Burning epigastric pain    • Fatigue    • Gastroesophageal reflux disease    • Hip pain    • Knee pain    • Laboratory examination ordered as part of a routine general medical examination in pediatric patient     General examination of patient    • Low back pain    • Lumbosacral strain    • Metabolic syndrome X    • Morbid obesity (CMS/[facility])     BMI 40.4    • MRSA (methicillin resistant Staphylococcus aureus) 2011    abscess on face   • Nausea and vomiting    • Obesity     Hyperinsulinar obesity    • Otitis media    • Pain in limb     right limb   • Rhinitis         OB History     No data available           Social History     Socioeconomic History   • Marital status: Single     Spouse name: Not on file   • Number of children: Not on file   • Years of education: Not on file   • Highest education level: Not on file   Tobacco Use   • Smoking status: Never Smoker   • Smokeless tobacco: Never Used   Substance and Sexual Activity   • Alcohol use: No   • Drug use: No   • Sexual activity: Defer        Past Surgical History:   Procedure Laterality Date   • COLONOSCOPY N/A 4/17/2019    Procedure: COLONOSCOPY;  Surgeon: Gian Todd MD;  Location: [facility] ENDOSCOPY;  Service: Gastroenterology   • DIAGNOSTIC LAPAROSCOPY  06/21/2007    Exam under anesthesia, diagnostic laparoscopy, diagnostic hysteroscopy, fractional dilatation and curetttage   • ENDOSCOPY N/A 4/17/2019    Procedure: ESOPHAGOGASTRODUODENOSCOPY;  Surgeon: Gian Todd MD;  Location: [facility] ENDOSCOPY;  Service: Gastroenterology   • INJECTION OF MEDICATION  11/05/2012    Kenalog (1)  -  SILVESTRE Valdes   • PAP SMEAR  08/08/2011    Negative   • SALPINGO OOPHORECTOMY  2004    Left   [place]   • TUBAL ABDOMINAL LIGATION  2010    [place]        Patient Active Problem List   Diagnosis   • Insulin resistance   • Obesity, Class III,  "BMI 40-49.9 (morbid obesity) (CMS/HCC)   • Change in bowel habits   • Gastroesophageal reflux disease   • Blood in stool   • Generalized abdominal pain   • Encounter for gynecological examination without abnormal finding   • Abnormal uterine bleeding (AUB)   • Thickened endometrium   • Tinea corporis        Documented Vitals    09/06/19 0944   BP: 120/80   Weight: 133 kg (292 lb 9.6 oz)   Height: 180.3 cm (71\")   PainSc:   4   PainLoc: Abdomen        Body mass index is 40.81 kg/m².    Physical Exam  Constitutional: Appears to be in no acute distress; Eyes: sclera normal; Endocrine system: thyroid palpate is normal; Pulmonary system: lungs clear; Cardiovascular system: heart regular rate and rhythm; Gastrointestinal system: abdomen soft nontender, active bowel sounds; Urologic system: CVA negative; Psychiatric: appropriate insight; Neurologic: gait within normal limits    Laboratory Data:   Lab Results - Last 18 Months   Lab Units 08/11/19  1158 03/20/19  0925   GLUCOSE mg/dL 92 99   BUN mg/dL 12 17   CREATININE mg/dL 1.00 0.89   SODIUM mmol/L 139 136*   POTASSIUM mmol/L 4.1 3.9   CHLORIDE mmol/L 104 99   CO2 mmol/L 24.0 28.0   CALCIUM mg/dL 8.8 9.1   TOTAL PROTEIN g/dL 7.4 7.5   ALBUMIN g/dL 4.50 4.30   ALT (SGPT) U/L 16 24   AST (SGOT) U/L 16 24   ALK PHOS U/L 68 71   BILIRUBIN mg/dL 0.2 0.4   EGFR IF NONAFRICN AM mL/min/1.73 62 71   GLOBULIN gm/dL 2.9 3.2   A/G RATIO g/dL 1.6 1.3   BUN / CREAT RATIO  12.0 19.1   ANION GAP mmol/L 11.0 9.0     Lab Results - Last 18 Months   Lab Units 08/23/19  1108 08/11/19  1135 03/20/19  0925   WBC 10*3/mm3 6.67 9.38 6.59   RBC 10*6/mm3 5.25 4.89 4.97   HEMOGLOBIN g/dL 14.6 13.9 13.9   HEMATOCRIT % 44.4 41.7 41.8   MCV fL 84.6 85.3 84.1   MCH pg 27.8 28.4 28.0   MCHC g/dL 32.9 33.3 33.3   RDW % 12.6 13.1 13.1   RDW-SD fl 38.5 39.9 39.8   MPV fL 10.7 10.9 9.7   PLATELETS 10*3/mm3 338 264 305     No results for input(s): HCGQUAL in the last 58807 hours.    Assessment        " Diagnosis Plan   1. Thickened endometrium  Tissue Pathology Exam we did a office hysteroscopy that showed no mass lesions (i.e. no polyps or submucosal fibroids) he did an endometrial biopsy also   2. Abnormal uterine bleeding (AUB)     3. Tinea corporis   she has some tinea corporis chest wall prescription sent and         Plan         New Medications Ordered This Visit   Medications   • clotrimazole (LOTRIMIN) 1 % external solution     Sig: Apply  topically to the appropriate area as directed 2 (Two) Times a Day.     Dispense:  60 mL     Refill:  5     Procedure hysteroscopy (Endo see) with endometrial biopsy    Preprocedure menorrhagia thickened endometrium on ultrasound    Postprocedure same    Procedure:Procedure: Office hysteroscopy with endo-see and endometrial biopsy    Preprocedure: Menorrhagia    Postprocedure same    Findings: On hysteroscopy there was no evidence of 3-dimensional masses    Procedure: The patient was identified verbally and by date of birth.  Time out was performed with patient participating.  [place] speculum was placed cervix cleansed with Betadine anterior lip of the cervix grasped with the double-tooth tenaculum.  Endosee placed through the endocervical canal and using daily and for distention the cavity was viewed with good panoramic view of cavity no 3-dimensional masses were seen.  An endometrial biopsy was then performed evaluating all quadrants fundus and both cornual.  Patient tolerated well        This document has been electronically signed by Benson Palumbo MD on September 7, 2019 4:42 PM    Please note that portions of this note were completed with a voice recognition program.

## 2019-09-07 PROBLEM — B35.4 TINEA CORPORIS: Status: ACTIVE | Noted: 2019-09-07

## 2019-09-10 LAB
LAB AP CASE REPORT: NORMAL
PATH REPORT.FINAL DX SPEC: NORMAL
PATH REPORT.GROSS SPEC: NORMAL

## 2019-09-12 ENCOUNTER — DOCUMENTATION (OUTPATIENT)
Dept: OBSTETRICS AND GYNECOLOGY | Facility: CLINIC | Age: 39
End: 2019-09-12

## 2019-09-12 NOTE — PROGRESS NOTES
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

## 2019-09-27 ENCOUNTER — TELEPHONE (OUTPATIENT)
Dept: OBSTETRICS AND GYNECOLOGY | Facility: CLINIC | Age: 39
End: 2019-09-27

## 2019-09-27 DIAGNOSIS — R30.0 DYSURIA: Primary | ICD-10-CM

## 2021-06-12 ENCOUNTER — HOSPITAL ENCOUNTER (EMERGENCY)
Facility: HOSPITAL | Age: 41
End: 2021-06-12

## 2021-06-16 ENCOUNTER — OFFICE VISIT (OUTPATIENT)
Dept: OBSTETRICS AND GYNECOLOGY | Facility: CLINIC | Age: 41
End: 2021-06-16

## 2021-06-16 VITALS
WEIGHT: 293 LBS | DIASTOLIC BLOOD PRESSURE: 88 MMHG | HEIGHT: 71 IN | BODY MASS INDEX: 41.02 KG/M2 | SYSTOLIC BLOOD PRESSURE: 132 MMHG

## 2021-06-16 DIAGNOSIS — N64.4 MASTALGIA: Primary | ICD-10-CM

## 2021-06-16 DIAGNOSIS — Z12.31 ENCOUNTER FOR SCREENING MAMMOGRAM FOR MALIGNANT NEOPLASM OF BREAST: ICD-10-CM

## 2021-06-16 PROCEDURE — 99212 OFFICE O/P EST SF 10 MIN: CPT | Performed by: OBSTETRICS & GYNECOLOGY

## 2021-06-16 NOTE — PROGRESS NOTES
Janell Suazo is a 41 y.o. y/o female.     Chief Complaint: Mastalgia    HPI:   41 y.o. No obstetric history on file..  Patient's last menstrual period was 06/07/2021..  Patient complains of some right mastalgia over the last few weeks made worse by palpation not severe no particular pattern of radiation.  Her last Pap was 2019 - with negative HPV          Review of Systems     Constitutional: Denies night sweats    HENT: No hearing changes, denies ear pain    Eye: No eye pain; no foreign body in eye    Pulmonary: No hemoptysis    Cardiovascular: No claudication    GI: No hematemesis    Musculoskeletal: No arthralgias, no joint swelling    Endocrine: No polydipsia or polyuria    Hematologic: Denies any free bleeding    Psychiatric: Denies any delusions    The following portions of the patient's history were reviewed and updated as appropriate: allergies, current medications, past family history, past medical history, past social history, past surgical history and problem list.    No Known Allergies     Outpatient Medications Prior to Visit   Medication Sig Dispense Refill   • ibuprofen (ADVIL,MOTRIN) 200 MG tablet Take 200 mg by mouth Every 6 (Six) Hours As Needed for Mild Pain .     • pantoprazole (PROTONIX) 40 MG EC tablet Take 1 tablet by mouth Daily. 30 tablet 5     No facility-administered medications prior to visit.        The patient has a family history of   Family History   Problem Relation Age of Onset   • Colon cancer Other         Colorectal cancer   • Diabetes Other    • Hypertension Other    • Thyroid disease Other         Past Medical History:   Diagnosis Date   • Acute otitis media with effusion    • Allergic rhinitis    • Amenorrhea    • Anxiety state    • Diarrhea    • Encounter for gynecological examination    • Encounter for gynecological examination without abnormal finding    • Epigastric pain     Burning epigastric pain    • Fatigue    • Gastroesophageal reflux disease    • Hip  pain    • Knee pain    • Laboratory examination ordered as part of a routine general medical examination in pediatric patient     General examination of patient    • Low back pain    • Lumbosacral strain    • Metabolic syndrome X    • Morbid obesity (CMS/HCC)     BMI 40.4    • MRSA (methicillin resistant Staphylococcus aureus) 2011    abscess on face   • Nausea and vomiting    • Obesity     Hyperinsulinar obesity    • Otitis media    • Pain in limb     right limb   • Rhinitis         OB History    No obstetric history on file.          Social History     Socioeconomic History   • Marital status: Single     Spouse name: Not on file   • Number of children: Not on file   • Years of education: Not on file   • Highest education level: Not on file   Tobacco Use   • Smoking status: Never Smoker   • Smokeless tobacco: Never Used   Substance and Sexual Activity   • Alcohol use: No   • Drug use: No   • Sexual activity: Defer        Past Surgical History:   Procedure Laterality Date   • COLONOSCOPY N/A 4/17/2019    Procedure: COLONOSCOPY;  Surgeon: Gian Todd MD;  Location: [facility] ENDOSCOPY;  Service: Gastroenterology   • DIAGNOSTIC LAPAROSCOPY  06/21/2007    Exam under anesthesia, diagnostic laparoscopy, diagnostic hysteroscopy, fractional dilatation and curetttage   • ENDOSCOPY N/A 4/17/2019    Procedure: ESOPHAGOGASTRODUODENOSCOPY;  Surgeon: Gian Todd MD;  Location: [facility] ENDOSCOPY;  Service: Gastroenterology   • INJECTION OF MEDICATION  11/05/2012    Kenalog (1)  -  W. Valdes   • PAP SMEAR  08/08/2011    Negative   • SALPINGO OOPHORECTOMY  2004    Left   [place], [place]   • TUBAL ABDOMINAL LIGATION  2010    [place]        Patient Active Problem List   Diagnosis   • Insulin resistance   • Obesity, Class III, BMI 40-49.9 (morbid obesity) (CMS/[facility])   • Change in bowel habits   • Gastroesophageal reflux disease   • Blood in stool   • Generalized abdominal pain   • Encounter for gynecological examination  "without abnormal finding   • Abnormal uterine bleeding (AUB)   • Thickened endometrium   • Tinea corporis        Documented Vitals    06/16/21 1311   BP: 132/88   Weight: 135 kg (298 lb 9.6 oz)   Height: 180.3 cm (71\")   PainSc:   6        Body mass index is 41.65 kg/m².    Physical Exam  Constitutional: Appears to be in no acute distress; Eyes: Sclerae normal; Endocrine system: Thyroid palpation is normal; Pulmonary system: Lungs clear; Cardiovascular system: Heart regular rate and rhythm; Gastrointestinal system: Abdomen soft and nontender, active bowel sounds; Urologic system: CVA negative; Psychiatric: Appropriate insight; Neurologic: Gait within normal limits breast no masses or discharge mild tenderness on right    Laboratory Data:   No results for input(s): GLUCOSE, BUN, CREATININE, NA, K, CL, CO2, CALCIUM, PROTEINTOT, ALBUMIN, ALT, AST, ALKPHOS, BILITOT, EGFRIFNONA, GLOB, AGRATIO, BCR, ANIONGAP, BILIDIR, INDBILI in the last 18955 hours.  No results for input(s): WBC, RBC, HGB, HCT, MCV, MCH, MCHC, RDW, RDWSD, MPV, PLT in the last 78594 hours.  No results for input(s): HCGQUAL in the last 71460 hours.    Assessment        Diagnosis Plan   1. Mastalgia   mammogram         Plan       No orders of the defined types were placed in this encounter.            This document has been electronically signed by Benson Palumbo MD on June 16, 2021 13:23 CDT    Please note that portions of this note were completed with a voice recognition program.           "

## 2021-06-18 ENCOUNTER — HOSPITAL ENCOUNTER (EMERGENCY)
Facility: HOSPITAL | Age: 41
Discharge: HOME | End: 2021-06-18
Attending: FAMILY MEDICINE | Admitting: FAMILY MEDICINE

## 2021-06-18 ENCOUNTER — APPOINTMENT (OUTPATIENT)
Dept: CT IMAGING | Facility: HOSPITAL | Age: 41
End: 2021-06-18

## 2021-06-18 ENCOUNTER — APPOINTMENT (OUTPATIENT)
Dept: ULTRASOUND IMAGING | Facility: HOSPITAL | Age: 41
End: 2021-06-18

## 2021-06-18 VITALS
DIASTOLIC BLOOD PRESSURE: 92 MMHG | RESPIRATION RATE: 20 BRPM | OXYGEN SATURATION: 95 % | HEART RATE: 83 BPM | BODY MASS INDEX: 40.6 KG/M2 | TEMPERATURE: 98.1 F | SYSTOLIC BLOOD PRESSURE: 147 MMHG | WEIGHT: 290 LBS | HEIGHT: 71 IN

## 2021-06-18 DIAGNOSIS — S06.0X0A CONCUSSION WITHOUT LOSS OF CONSCIOUSNESS, INITIAL ENCOUNTER: Primary | ICD-10-CM

## 2021-06-18 DIAGNOSIS — S16.1XXA STRAIN OF NECK MUSCLE, INITIAL ENCOUNTER: ICD-10-CM

## 2021-06-18 PROCEDURE — 99283 EMERGENCY DEPT VISIT LOW MDM: CPT

## 2021-06-18 PROCEDURE — 72125 CT NECK SPINE W/O DYE: CPT

## 2021-06-18 PROCEDURE — 70450 CT HEAD/BRAIN W/O DYE: CPT

## 2021-06-18 RX ORDER — ONDANSETRON 4 MG/1
4 TABLET, ORALLY DISINTEGRATING ORAL EVERY 6 HOURS PRN
Qty: 10 TABLET | Refills: 0 | OUTPATIENT
Start: 2021-06-18 | End: 2022-11-15

## 2021-06-18 RX ORDER — CYCLOBENZAPRINE HCL 10 MG
10 TABLET ORAL 3 TIMES DAILY PRN
Qty: 21 TABLET | Refills: 0 | OUTPATIENT
Start: 2021-06-18 | End: 2022-11-15

## 2021-07-26 PROCEDURE — 87660 TRICHOMONAS VAGIN DIR PROBE: CPT | Performed by: FAMILY MEDICINE

## 2021-07-26 PROCEDURE — 87480 CANDIDA DNA DIR PROBE: CPT | Performed by: FAMILY MEDICINE

## 2021-07-26 PROCEDURE — 87510 GARDNER VAG DNA DIR PROBE: CPT | Performed by: FAMILY MEDICINE

## 2021-09-03 ENCOUNTER — APPOINTMENT (OUTPATIENT)
Dept: VACCINE CLINIC | Facility: HOSPITAL | Age: 41
End: 2021-09-03

## 2021-09-10 ENCOUNTER — APPOINTMENT (OUTPATIENT)
Dept: VACCINE CLINIC | Facility: HOSPITAL | Age: 41
End: 2021-09-10

## 2021-09-24 ENCOUNTER — APPOINTMENT (OUTPATIENT)
Dept: VACCINE CLINIC | Facility: HOSPITAL | Age: 41
End: 2021-09-24

## 2021-10-01 ENCOUNTER — APPOINTMENT (OUTPATIENT)
Dept: VACCINE CLINIC | Facility: HOSPITAL | Age: 41
End: 2021-10-01

## 2022-12-08 ENCOUNTER — HOSPITAL ENCOUNTER (OUTPATIENT)
Dept: PHYSICAL THERAPY | Facility: HOSPITAL | Age: 42
Setting detail: THERAPIES SERIES
Discharge: HOME | End: 2022-12-08
Payer: COMMERCIAL

## 2022-12-08 DIAGNOSIS — M54.9 BACKACHE WITHOUT RADIATION: Primary | ICD-10-CM

## 2022-12-08 PROCEDURE — 97162 PT EVAL MOD COMPLEX 30 MIN: CPT

## 2022-12-08 NOTE — THERAPY EVALUATION
Outpatient Physical Therapy Ortho Initial Evaluation  [facility]     Patient Name: Janell Suazo  : 1980  MRN: [id number]  Today's Date: 2022      Visit Date: 2022     ATTENDANCE:   SUBJECTIVE IMPROVEMENT: not assessed at initial evaluation  NEXT MD APPOINTMENT: EMILIA  RECERT DATE: 22    THERAPY DIAGNOSIS: low back pain       Patient Active Problem List   Diagnosis   • Insulin resistance   • Obesity, Class III, BMI 40-49.9 (morbid obesity) (Former[facility])   • Change in bowel habits   • Gastroesophageal reflux disease   • Blood in stool   • Generalized abdominal pain   • Encounter for gynecological examination without abnormal finding   • Abnormal uterine bleeding (AUB)   • Thickened endometrium   • Tinea corporis        Past Medical History:   Diagnosis Date   • Acute otitis media with effusion    • Allergic rhinitis    • Amenorrhea    • Anxiety state    • Diarrhea    • Encounter for gynecological examination    • Encounter for gynecological examination without abnormal finding    • Epigastric pain     Burning epigastric pain    • Fatigue    • Gastroesophageal reflux disease    • Hip pain    • Knee pain    • Laboratory examination ordered as part of a routine general medical examination in pediatric patient     General examination of patient    • Low back pain    • Lumbosacral strain    • Metabolic syndrome X    • Morbid obesity ([facility])     BMI 40.4    • MRSA (methicillin resistant Staphylococcus aureus) 2011    abscess on face   • Nausea and vomiting    • Obesity     Hyperinsulinar obesity    • Otitis media    • Pain in limb     right limb   • Rhinitis         Past Surgical History:   Procedure Laterality Date   • COLONOSCOPY N/A 2019    Procedure: COLONOSCOPY;  Surgeon: Gian Todd MD;  Location: [facility] ENDOSCOPY;  Service: Gastroenterology   • DIAGNOSTIC LAPAROSCOPY  2007    Exam under anesthesia, diagnostic laparoscopy, diagnostic hysteroscopy, fractional  dilatation and curetttage   • ENDOSCOPY N/A 4/17/2019    Procedure: ESOPHAGOGASTRODUODENOSCOPY;  Surgeon: Gian Todd MD;  Location: [facility] ENDOSCOPY;  Service: Gastroenterology   • INJECTION OF MEDICATION  11/05/2012    Kenalog (1)  -  SILVESTRE Valdes   • PAP SMEAR  08/08/2011    Negative   • SALPINGO OOPHORECTOMY  2004    Left   [place]   • TUBAL ABDOMINAL LIGATION  2010    [place], [place]       Visit Dx:     ICD-10-CM ICD-9-CM   1. Backache without radiation  M54.9 724.5          Patient History     Row Name 12/08/22 1600             History    Chief Complaint Pain;Tightness;Muscle tenderness  -AC      Type of Pain Back pain  -AC      Brief Description of Current Complaint Pt notes that ~1 month ago she started having low back pain which has been an on/off issue for many years. She reports that she was having muscle spasms in the left side low back/hip which caused her to feel like L hip was raghu than R. She had increased pain with all sitting/supine/rolling activities and transition . Went to MD and received Toradol and steroid injections which helped for about 1 da then wore off and pain increased. Has also completed steroid geovani and is using muscle relaxer and medication for arthritis. Notes that she has a lot of pain with transitions in and out of standing/sitting and with rolling in bed. Reports pain with getting in and out of car and with extended sitting in car. female, 4 steps to enter, tub shower- no shower chair does have diffiuclty with getting into/out.  Gaston foster completed PT for the low back before.  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  -AC      What clinical tests have you had for this problem? X-ray  -AC      Results of Clinical Tests VERTEBRAE:  Mild lower  lumbar facet arthritis.  Normal  alignment.    DISC SPACES:  Moderate degenerative changes in the lower  thoracic spine with disc space narrowing and marginal  osteophytes.    SOFT TISSUES:  Unremarkable.  -AC         Pain     Pain Location Back  -AC      Pain at Present 0  -AC      Pain at Best 0  -AC      Pain at Worst 9  -AC      Pain Frequency Intermittent  -AC      Pain Description Sharp;Shooting;Aching;Tightness  -AC      What Performance Factors Make the Current Problem(s) WORSE? extended standing/walking, transitions in/out of sitting, supine, and standing. bending/twisting.  -AC      What Performance Factors Make the Current Problem(s) BETTER? rest, medication, stretching  -AC      Is your sleep disturbed? Yes  -AC      Difficulties at work? when back is hurting she does have increased pain with standing for work however tries to shift weight.,  -AC      Difficulties with ADL's? when back is really bad she has diffiuclty with bathing, dressing and toileting. other times more diffiuclty  with household chores.  -AC         Fall Risk Assessment    Any falls in the past year: No  -AC         Daily Activities    Primary Language English  -AC            User Key  (r) = Recorded By, (t) = Taken By, (c) = Cosigned By    Initials Name Provider Type    AC Lis Lezama, PT Physical Therapist                 PT Ortho     Row Name 12/08/22 1600       Subjective Comments    Subjective Comments see pt hx.  -AC       Precautions and Contraindications    Precautions/Limitations no known precautions/limitations  -AC       Subjective Pain    Able to rate subjective pain? yes  -AC       Posture/Observations    Posture/Observations Comments L posterior rotation at SI, corrected well with MET, - shotgun.  -AC       Special Tests/Palpation    Special Tests/Palpation Lumbar/SI  -AC       Lumbosacral Accessory Motions    Lumbosacral Accessory Motions Tested? Yes  -AC    PA Glide- L1 Hypomobile  -AC    PA Glide- L2 Hypomobile   -AC    PA Glide- L3 Hypomobile  -AC    PA Glide- L4 Hypomobile  -AC    PA Glide- L5 Hypomobile  -AC       Lumbar/SI Special Tests    Slump Test (Neural Tension) Left:;Positive  -AC    SLR (Neural Tension) Left:;Positive  -AC    SI Compression Test (SI Dysfunction) Left:;Positive  -AC       Lumbosacral Palpation    Lumbosacral Palpation? Yes  -AC    SI Left:;Tender;Guarded/taut  -AC    Lumbosacral Segment Tender  -AC    Spinous Process Tender  -AC    Piriformis Tender;Guarded/taut  L>R  -AC    Quadratus Lumborum Bilateral:;Tender;Guarded/taut  -AC    Erector Spinae (Paraspinals) Bilateral:;Tender;Guarded/taut  -AC       General ROM    Head/Neck/Trunk Trunk Extension;Trunk Flexion;Trunk Lt Lateral Flexion;Trunk Rt Lateral Flexion;Trunk Lt Rotation;Trunk Rt Rotation;Comments  -AC       Head/Neck/Trunk    Trunk Extension AROM limited 50% with increased mildine low back pain  -AC    Trunk Flexion AROM tips to mid tibia with pulling in B HS and low back  returning to stand pt leans to the R to off weight L low back musculature.  -AC    Trunk Lt Lateral Flexion AROM tips 3 inches from patella  L sided low back pain  -AC    Trunk Rt Lateral Flexion AROM tips 1 inch from patella  -AC    Trunk Lt Rotation AROM WNLs  -AC    Trunk Rt Rotation AROM WNLs  -AC       MMT (Manual Muscle Testing)    General MMT Comments RLE grossly 5/5. LLE grossly 4+/5 except knee extension 4/5 with increased L hip pain  -AC       Sensation    Additional Comments no c/o BLE numbness/tingling  -AC       Flexibility    Flexibility Tested? Lower Extremity  -AC       Lower Extremity Flexibility    Hamstrings Bilateral:;Mildly limited  -AC    Hip External Rotators Bilateral:;Mildly limited  -AC    Hip Internal Rotators Bilateral:;Mildly limited  -AC    Quadratus Lumborum Bilateral:;Moderately limited  -AC    Gastrocnemius Bilateral:;Moderately limited  -AC    Soleus Bilateral:;Moderately limited  -AC          User Key  (r) = Recorded By, (t) = Taken By,  (c) = Cosigned By    Initials Name Provider Type    Lis Stokes, PT Physical Therapist                            Therapy Education  Education Details: stretching- LTR, HS, piriformis, and QL  Given: HEP  Program: New  How Provided: Verbal, Demonstration, Written  Provided to: Patient  Level of Understanding: Verbalized, Demonstrated      PT OP Goals     Row Name 12/08/22 1700          PT Short Term Goals    STG Date to Achieve 01/04/23  -AC     STG 1 Pt is indpt with HEP.  -AC     STG 1 Progress New  -AC     STG 2 Pt demo's minimal palpation tenderness to L SI.  -AC     STG 2 Progress New  -AC     STG 3 Pt demo's ability to maintain SI alignemnt or self-correct as needed.  -AC     STG 3 Progress New  -AC     STG 4 Pt demo's - slump test on LLE.  -AC     STG 4 Progress New  -AC        Long Term Goals    LTG Date to Achieve 02/02/23  -AC     LTG 1 Pt demos LLE MMT 5/5 with minimal c/o pain.  -AC     LTG 1 Progress New  -AC     LTG 2 Pt demo's improved trunk ROM to touching within 3 inches of toes.  -AC     LTG 2 Progress New  -AC     LTG 3 Pt demo's equal non-painful trunk lateral flexion bilaterally.  -AC     LTG 3 Progress New  -AC     LTG 4 Pt demo's improved core strength to maintain neutral pelvic alignment with 20x bridges.  -AC     LTG 4 Progress New  -AC     LTG 5 Subjective improvement 75% or better.  -AC     LTG 5 Progress New  -AC        Time Calculation    PT Goal Re-Cert Due Date 12/29/22  -AC           User Key  (r) = Recorded By, (t) = Taken By, (c) = Cosigned By    Initials Name Provider Type    Lis Stokes, PT Physical Therapist                 PT Assessment/Plan     Row Name 12/08/22 1700          PT Assessment    Functional Limitations Impaired locomotion;Limitation in home management;Limitations in functional capacity and performance;Performance in leisure activities;Performance in self-care ADL;Performance in work activities  -AC     Impairments Gait;Impaired  flexibility;Muscle strength;Pain;Posture;Range of motion;Joint mobility  -AC     Assessment Comments The pt is a 41 y/o female who presents today with c/o L sided low back pain which is a chronic issue that comes and goes. She presents today with significant tightness and tenderness throughout L>R low back paraspinals, QL, and glutes/ERs. She has decreased LLE strength most notable with knee extension. She demos + slump test and SI compression test on LLE as well. She will benefit from skilled PT to improve overall trunk and hip mobility and strength to decrease pain and return to PLOF.  -AC     Rehab Potential Fair  -AC     Patient/caregiver participated in establishment of treatment plan and goals Yes  -AC     Patient would benefit from skilled therapy intervention Yes  -AC        PT Plan    PT Frequency 1x/week;2x/week  -AC     Predicted Duration of Therapy Intervention (PT) 8-10 weeks  -AC     PT Plan Comments start 1x/week per pt request due to co-pay, if needed can increase to 2x. Start with land- LE and core stretching/strength and manual/modalities as needed with focus on lumbar/trunk strength and maintaining SI alignment and decreasing muscle tension. if needed may complete aquatics.  -AC           User Key  (r) = Recorded By, (t) = Taken By, (c) = Cosigned By    Initials Name Provider Type    AC Lis Lezama, PT Physical Therapist                                    Outcome Measure Options: Modified Oswestry  Modified Oswestry  Modified Oswestry Score/Comments: 30/50; 60%      Time Calculation:     Start Time: 1600  Stop Time: 1653  Time Calculation (min): 53 min  Untimed Charges  PT Eval/Re-eval Minutes: 53  Total Minutes  Untimed Charges Total Minutes: 53   Total Minutes: 53     Therapy Charges for Today     Code Description Service Date Service Provider Modifiers Qty    60225560787  PT EVAL MOD COMPLEXITY 4 12/8/2022 Lis Lezama, PT GP 1                   Lis Lezama, PT  12/8/2022

## 2022-12-16 ENCOUNTER — APPOINTMENT (OUTPATIENT)
Dept: PHYSICAL THERAPY | Facility: HOSPITAL | Age: 42
End: 2022-12-16
Payer: COMMERCIAL

## 2022-12-20 ENCOUNTER — HOSPITAL ENCOUNTER (OUTPATIENT)
Dept: PHYSICAL THERAPY | Facility: HOSPITAL | Age: 42
Setting detail: THERAPIES SERIES
Discharge: HOME | End: 2022-12-20
Payer: COMMERCIAL

## 2022-12-20 DIAGNOSIS — M54.9 BACKACHE WITHOUT RADIATION: Primary | ICD-10-CM

## 2022-12-20 PROCEDURE — 97110 THERAPEUTIC EXERCISES: CPT

## 2022-12-20 PROCEDURE — 97140 MANUAL THERAPY 1/> REGIONS: CPT

## 2022-12-20 NOTE — THERAPY TREATMENT NOTE
Outpatient Physical Therapy Ortho Treatment Note  [facility]     Patient Name: Janell Suazo  : 1980  MRN: [id number]  Today's Date: 2022      Visit Date: 2022     ATTENDANCE:   SUBJECTIVE IMPROVEMENT: not assessed  NEXT MD APPOINTMENT: EMILIA  RECERT DATE: 22    THERAPY DIAGNOSIS: low back pain       Visit Dx:    ICD-10-CM ICD-9-CM   1. Backache without radiation  M54.9 724.5       Patient Active Problem List   Diagnosis   • Insulin resistance   • Obesity, Class III, BMI 40-49.9 (morbid obesity) ([facility])   • Change in bowel habits   • Gastroesophageal reflux disease   • Blood in stool   • Generalized abdominal pain   • Encounter for gynecological examination without abnormal finding   • Abnormal uterine bleeding (AUB)   • Thickened endometrium   • Tinea corporis        Past Medical History:   Diagnosis Date   • Acute otitis media with effusion    • Allergic rhinitis    • Amenorrhea    • Anxiety state    • Diarrhea    • Encounter for gynecological examination    • Encounter for gynecological examination without abnormal finding    • Epigastric pain     Burning epigastric pain    • Fatigue    • Gastroesophageal reflux disease    • Hip pain    • Knee pain    • Laboratory examination ordered as part of a routine general medical examination in pediatric patient     General examination of patient    • Low back pain    • Lumbosacral strain    • Metabolic syndrome X    • Morbid obesity ([facility])     BMI 40.4    • MRSA (methicillin resistant Staphylococcus aureus) 2011    abscess on face   • Nausea and vomiting    • Obesity     Hyperinsulinar obesity    • Otitis media    • Pain in limb     right limb   • Rhinitis         Past Surgical History:   Procedure Laterality Date   • COLONOSCOPY N/A 2019    Procedure: COLONOSCOPY;  Surgeon: Gian Todd MD;  Location: [facility] ENDOSCOPY;  Service: Gastroenterology   • DIAGNOSTIC LAPAROSCOPY  2007    Exam under anesthesia,  Discussed the risks/benefits of laser capsulotomy. Laser recommended. Patient elects to proceed. diagnostic laparoscopy, diagnostic hysteroscopy, fractional dilatation and curetttage   • ENDOSCOPY N/A 4/17/2019    Procedure: ESOPHAGOGASTRODUODENOSCOPY;  Surgeon: Gian Todd MD;  Location: [facility] ENDOSCOPY;  Service: Gastroenterology   • INJECTION OF MEDICATION  11/05/2012    Kenalog (1)  -  SILVESTRE Valdes   • PAP SMEAR  08/08/2011    Negative   • SALPINGO OOPHORECTOMY  2004    Left   [place]   • TUBAL ABDOMINAL LIGATION  2010    [place]                        PT Assessment/Plan     Row Name 12/20/22 1600          PT Assessment    Assessment Comments treatment tolerated well today with focus on manual and into gentle core/pelvic floor strengthening to add to HEP today. good tolerance to all exercises.  -AC     Rehab Potential Fair  -AC     Patient/caregiver participated in establishment of treatment plan and goals Yes  -AC     Patient would benefit from skilled therapy intervention Yes  -AC        PT Plan    PT Frequency 1x/week;2x/week  -AC     Predicted Duration of Therapy Intervention (PT) 8-10 weeks  -AC     PT Plan Comments continue with manual and stretching/strengthening as tolerated  -AC           User Key  (r) = Recorded By, (t) = Taken By, (c) = Cosigned By    Initials Name Provider Type    AC Lis Lezama, PT Physical Therapist                   OP Exercises     Row Name 12/20/22 1600             Subjective Comments    Subjective Comments Pt notes doing okay today, still very cautious with movements however has had an easier week at work with less standing. has been able to do stretches 1-2x/day.  -AC         Subjective Pain    Able to rate subjective pain? yes  -AC         Total Minutes    53016 - PT Therapeutic Exercise Minutes 25  -AC      18739 - PT Manual Therapy Minutes 20  -AC         Exercise 1    Exercise Name 1 MHP to low back  -AC      Time 1 10 min  -AC         Exercise 2    Exercise Name 2 see manual  -AC      Time 2 20 min  -AC         Exercise 3    Exercise Name 3  SKTC  -AC      Sets 3 1  -AC      Reps 3 10  -AC      Time 3 10s hold  -AC      Additional Comments BLE  -AC         Exercise 4    Exercise Name 4 hooklying TA draws  -AC      Sets 4 1  -AC      Reps 4 20  -AC         Exercise 5    Exercise Name 5 standing PPT  -AC      Sets 5 1  -AC      Reps 5 20  -AC            User Key  (r) = Recorded By, (t) = Taken By, (c) = Cosigned By    Initials Name Provider Type    AC Lis Lezama, PT Physical Therapist                         Manual Rx (last 36 hours)     Manual Treatments     Row Name 12/20/22 1600 12/20/22 1500          Total Minutes    04030 - PT Manual Therapy Minutes 20  -AC --        Manual Rx 1    Manual Rx 1 Location -- L: lumbar paraspinals, QL, glutes/piriformis  -AC     Manual Rx 1 Type -- STM/TPR  -AC     Manual Rx 1 Duration -- 20 min  -AC           User Key  (r) = Recorded By, (t) = Taken By, (c) = Cosigned By    Initials Name Provider Type    AC Lis Lezama, PT Physical Therapist                 PT OP Goals     Row Name 12/20/22 1600          PT Short Term Goals    STG Date to Achieve 01/04/23  -AC     STG 1 Pt is indpt with HEP.  -AC     STG 1 Progress Ongoing  -AC     STG 2 Pt demo's minimal palpation tenderness to L SI.  -AC     STG 2 Progress Ongoing  -AC     STG 3 Pt demo's ability to maintain SI alignment or self-correct as needed.  -AC     STG 3 Progress Ongoing  -     STG 4 Pt demo's - slump test on LLE.  -     STG 4 Progress Ongoing  -        Long Term Goals    LTG Date to Achieve 02/02/23  -AC     LTG 1 Pt demos LLE MMT 5/5 with minimal c/o pain.  -AC     LTG 1 Progress Ongoing  -AC     LTG 2 Pt demo's improved trunk ROM to touching within 3 inches of toes.  -AC     LTG 2 Progress Ongoing  -AC     LTG 3 Pt demo's equal non-painful trunk lateral flexion bilaterally.  -AC     LTG 3 Progress Ongoing  -AC     LTG 4 Pt demo's improved core strength to maintain neutral pelvic alignment with 20x bridges.  -AC     LTG 4 Progress  Ongoing  -AC     LTG 5 Subjective improvement 75% or better.  -AC     LTG 5 Progress Ongoing  -AC        Time Calculation    PT Goal Re-Cert Due Date 12/29/22  -AC           User Key  (r) = Recorded By, (t) = Taken By, (c) = Cosigned By    Initials Name Provider Type    Lis Stokes, PT Physical Therapist                Therapy Education  Education Details: TA draws, and PPT  Given: HEP  Program: New  How Provided: Verbal, Demonstration, Written  Provided to: Patient  Level of Understanding: Verbalized, Demonstrated              Time Calculation:   Start Time: 1550  Stop Time: 1645  Time Calculation (min): 55 min  Timed Charges  49940 - PT Therapeutic Exercise Minutes: 25  40867 - PT Manual Therapy Minutes: 20  Total Minutes  Timed Charges Total Minutes: 45   Total Minutes: 45  Therapy Charges for Today     Code Description Service Date Service Provider Modifiers Qty    00256512175 HC PT THER PROC EA 15 MIN 12/20/2022 Lis Lezama, PT GP 2    39834288233 HC PT MANUAL THERAPY EA 15 MIN 12/20/2022 Lis Lezama, PT GP 1    76367003414 HC PT THER SUPP EA 15 MIN 12/20/2022 Lis Lezama, PT GP 1                    Lis Lezama, PT  12/20/2022

## 2023-01-11 ENCOUNTER — LAB (OUTPATIENT)
Dept: LAB | Facility: HOSPITAL | Age: 43
End: 2023-01-11
Payer: COMMERCIAL

## 2023-01-11 ENCOUNTER — OFFICE VISIT (OUTPATIENT)
Dept: OBSTETRICS AND GYNECOLOGY | Facility: CLINIC | Age: 43
End: 2023-01-11
Payer: COMMERCIAL

## 2023-01-11 VITALS
DIASTOLIC BLOOD PRESSURE: 86 MMHG | WEIGHT: 293 LBS | SYSTOLIC BLOOD PRESSURE: 134 MMHG | BODY MASS INDEX: 41.02 KG/M2 | HEIGHT: 71 IN

## 2023-01-11 DIAGNOSIS — R32 URINARY INCONTINENCE IN FEMALE: ICD-10-CM

## 2023-01-11 DIAGNOSIS — Z11.3 ROUTINE SCREENING FOR STI (SEXUALLY TRANSMITTED INFECTION): ICD-10-CM

## 2023-01-11 DIAGNOSIS — N93.9 ABNORMAL UTERINE BLEEDING (AUB): ICD-10-CM

## 2023-01-11 DIAGNOSIS — Z12.31 BREAST CANCER SCREENING BY MAMMOGRAM: Primary | ICD-10-CM

## 2023-01-11 DIAGNOSIS — N89.8 VAGINAL ITCHING: ICD-10-CM

## 2023-01-11 DIAGNOSIS — Z13.1 DIABETES MELLITUS SCREENING: ICD-10-CM

## 2023-01-11 LAB
BACTERIA UR QL AUTO: ABNORMAL /HPF
BASOPHILS # BLD AUTO: 0.04 10*3/MM3 (ref 0–0.2)
BASOPHILS NFR BLD AUTO: 0.5 % (ref 0–1.5)
BILIRUB UR QL STRIP: NEGATIVE
CANDIDA ALBICANS: NEGATIVE
CLARITY UR: CLEAR
COLOR UR: YELLOW
DEPRECATED RDW RBC AUTO: 37.8 FL (ref 37–54)
EOSINOPHIL # BLD AUTO: 0.23 10*3/MM3 (ref 0–0.4)
EOSINOPHIL NFR BLD AUTO: 2.7 % (ref 0.3–6.2)
ERYTHROCYTE [DISTWIDTH] IN BLOOD BY AUTOMATED COUNT: 12.9 % (ref 12.3–15.4)
GARDNERELLA VAGINALIS: POSITIVE
GLUCOSE UR STRIP-MCNC: ABNORMAL MG/DL
HBA1C MFR BLD: 5.8 % (ref 4.8–5.6)
HBV SURFACE AG SERPL QL IA: NORMAL
HCT VFR BLD AUTO: 40.5 % (ref 34–46.6)
HCV AB SER DONR QL: NORMAL
HGB BLD-MCNC: 13.5 G/DL (ref 12–15.9)
HGB UR QL STRIP.AUTO: ABNORMAL
HIV1+2 AB SER QL: NORMAL
HYALINE CASTS UR QL AUTO: ABNORMAL /LPF
IMM GRANULOCYTES # BLD AUTO: 0.04 10*3/MM3 (ref 0–0.05)
IMM GRANULOCYTES NFR BLD AUTO: 0.5 % (ref 0–0.5)
KETONES UR QL STRIP: NEGATIVE
LEUKOCYTE ESTERASE UR QL STRIP.AUTO: ABNORMAL
LYMPHOCYTES # BLD AUTO: 2.59 10*3/MM3 (ref 0.7–3.1)
LYMPHOCYTES NFR BLD AUTO: 30 % (ref 19.6–45.3)
MCH RBC QN AUTO: 27 PG (ref 26.6–33)
MCHC RBC AUTO-ENTMCNC: 33.3 G/DL (ref 31.5–35.7)
MCV RBC AUTO: 81 FL (ref 79–97)
MONOCYTES # BLD AUTO: 0.74 10*3/MM3 (ref 0.1–0.9)
MONOCYTES NFR BLD AUTO: 8.6 % (ref 5–12)
NEUTROPHILS NFR BLD AUTO: 4.98 10*3/MM3 (ref 1.7–7)
NEUTROPHILS NFR BLD AUTO: 57.7 % (ref 42.7–76)
NITRITE UR QL STRIP: NEGATIVE
NRBC BLD AUTO-RTO: 0 /100 WBC (ref 0–0.2)
PH UR STRIP.AUTO: 5.5 [PH] (ref 5–8)
PLATELET # BLD AUTO: 293 10*3/MM3 (ref 140–450)
PMV BLD AUTO: 10.2 FL (ref 6–12)
PROT UR QL STRIP: ABNORMAL
RBC # BLD AUTO: 5 10*6/MM3 (ref 3.77–5.28)
RBC # UR STRIP: ABNORMAL /HPF
REF LAB TEST METHOD: ABNORMAL
SP GR UR STRIP: 1.02 (ref 1–1.03)
SQUAMOUS #/AREA URNS HPF: ABNORMAL /HPF
T VAGINALIS DNA VAG QL PROBE+SIG AMP: NEGATIVE
TSH SERPL DL<=0.05 MIU/L-ACNC: 2.88 UIU/ML (ref 0.27–4.2)
UROBILINOGEN UR QL STRIP: ABNORMAL
WBC # UR STRIP: ABNORMAL /HPF
WBC NRBC COR # BLD: 8.62 10*3/MM3 (ref 3.4–10.8)

## 2023-01-11 PROCEDURE — 81001 URINALYSIS AUTO W/SCOPE: CPT

## 2023-01-11 PROCEDURE — 87660 TRICHOMONAS VAGIN DIR PROBE: CPT | Performed by: STUDENT IN AN ORGANIZED HEALTH CARE EDUCATION/TRAINING PROGRAM

## 2023-01-11 PROCEDURE — 85025 COMPLETE CBC W/AUTO DIFF WBC: CPT

## 2023-01-11 PROCEDURE — 84443 ASSAY THYROID STIM HORMONE: CPT

## 2023-01-11 PROCEDURE — 87510 GARDNER VAG DNA DIR PROBE: CPT | Performed by: STUDENT IN AN ORGANIZED HEALTH CARE EDUCATION/TRAINING PROGRAM

## 2023-01-11 PROCEDURE — 86803 HEPATITIS C AB TEST: CPT

## 2023-01-11 PROCEDURE — G0432 EIA HIV-1/HIV-2 SCREEN: HCPCS

## 2023-01-11 PROCEDURE — 36415 COLL VENOUS BLD VENIPUNCTURE: CPT

## 2023-01-11 PROCEDURE — 87661 TRICHOMONAS VAGINALIS AMPLIF: CPT

## 2023-01-11 PROCEDURE — 86592 SYPHILIS TEST NON-TREP QUAL: CPT

## 2023-01-11 PROCEDURE — 83036 HEMOGLOBIN GLYCOSYLATED A1C: CPT

## 2023-01-11 PROCEDURE — 87340 HEPATITIS B SURFACE AG IA: CPT

## 2023-01-11 PROCEDURE — 87086 URINE CULTURE/COLONY COUNT: CPT

## 2023-01-11 PROCEDURE — 99214 OFFICE O/P EST MOD 30 MIN: CPT | Performed by: STUDENT IN AN ORGANIZED HEALTH CARE EDUCATION/TRAINING PROGRAM

## 2023-01-11 PROCEDURE — 87491 CHLMYD TRACH DNA AMP PROBE: CPT

## 2023-01-11 PROCEDURE — 87591 N.GONORRHOEAE DNA AMP PROB: CPT

## 2023-01-11 PROCEDURE — 87480 CANDIDA DNA DIR PROBE: CPT | Performed by: STUDENT IN AN ORGANIZED HEALTH CARE EDUCATION/TRAINING PROGRAM

## 2023-01-11 RX ORDER — METRONIDAZOLE 500 MG/1
500 TABLET ORAL 2 TIMES DAILY
Qty: 14 TABLET | Refills: 0 | Status: SHIPPED | OUTPATIENT
Start: 2023-01-11 | End: 2023-01-18

## 2023-01-12 LAB
C TRACH RRNA CVX QL NAA+PROBE: NEGATIVE
N GONORRHOEA RRNA SPEC QL NAA+PROBE: NEGATIVE
RPR SER QL: NORMAL
TRICHOMONAS VAGINALIS PCR: NEGATIVE

## 2023-01-13 LAB — BACTERIA SPEC AEROBE CULT: NO GROWTH

## 2023-01-26 ENCOUNTER — CLINICAL SUPPORT (OUTPATIENT)
Dept: OBSTETRICS AND GYNECOLOGY | Facility: CLINIC | Age: 43
End: 2023-01-26
Payer: COMMERCIAL

## 2023-01-26 DIAGNOSIS — N89.8 VAGINAL ITCHING: Primary | ICD-10-CM

## 2023-01-26 DIAGNOSIS — N89.8 VAGINAL ITCHING: ICD-10-CM

## 2023-01-30 ENCOUNTER — TELEPHONE (OUTPATIENT)
Dept: OBSTETRICS AND GYNECOLOGY | Facility: CLINIC | Age: 43
End: 2023-01-30
Payer: COMMERCIAL

## 2023-01-30 RX ORDER — METRONIDAZOLE 500 MG/1
500 TABLET ORAL 2 TIMES DAILY
Qty: 14 TABLET | Refills: 0 | Status: SHIPPED | OUTPATIENT
Start: 2023-01-30 | End: 2023-02-06

## 2023-01-30 NOTE — TELEPHONE ENCOUNTER
Failed protocol - Metoprolol   Passed protocol - Atorvastatin       Last refill:  2/22/2019 Metformin 850 mg #180 1R  2/22/2019 Atorvastatin 40 mg #90 1R    Last lipid - 2/22/2019  Last HA1C - 2/22/2019       LOV:   2/22/2019 Dr Camacho Chaparro RTC 6 months    FOV s Spoke with patient per Dr. Tapia and let her know her oneswab results. I told the patient that everything came back normal. She is at a high increase for BV if she is having persistant symptoms. She stated that she was and I let her know that I will send in some medication. She verbalized her understanding.

## 2023-01-31 DIAGNOSIS — N89.8 VAGINAL ITCHING: ICD-10-CM

## 2023-02-01 ENCOUNTER — PROCEDURE VISIT (OUTPATIENT)
Dept: OBSTETRICS AND GYNECOLOGY | Facility: CLINIC | Age: 43
End: 2023-02-01
Payer: COMMERCIAL

## 2023-02-01 VITALS
DIASTOLIC BLOOD PRESSURE: 86 MMHG | BODY MASS INDEX: 41.02 KG/M2 | SYSTOLIC BLOOD PRESSURE: 122 MMHG | WEIGHT: 293 LBS | HEIGHT: 71 IN

## 2023-02-01 DIAGNOSIS — Z12.4 CERVICAL CANCER SCREENING: ICD-10-CM

## 2023-02-01 DIAGNOSIS — N93.9 ABNORMAL UTERINE BLEEDING (AUB): Primary | ICD-10-CM

## 2023-02-01 DIAGNOSIS — Z13.1 DIABETES MELLITUS SCREENING: ICD-10-CM

## 2023-02-01 LAB
B-HCG UR QL: NEGATIVE
EXPIRATION DATE: NORMAL
INTERNAL NEGATIVE CONTROL: NORMAL
INTERNAL POSITIVE CONTROL: NORMAL
Lab: NORMAL

## 2023-02-01 PROCEDURE — 99214 OFFICE O/P EST MOD 30 MIN: CPT | Performed by: STUDENT IN AN ORGANIZED HEALTH CARE EDUCATION/TRAINING PROGRAM

## 2023-02-01 PROCEDURE — 81025 URINE PREGNANCY TEST: CPT | Performed by: STUDENT IN AN ORGANIZED HEALTH CARE EDUCATION/TRAINING PROGRAM

## 2023-02-01 PROCEDURE — 58100 BIOPSY OF UTERUS LINING: CPT | Performed by: STUDENT IN AN ORGANIZED HEALTH CARE EDUCATION/TRAINING PROGRAM

## 2023-02-01 RX ORDER — FLUCONAZOLE 150 MG/1
TABLET ORAL
Qty: 2 TABLET | Refills: 0 | Status: SHIPPED | OUTPATIENT
Start: 2023-02-01 | End: 2023-02-21

## 2023-02-02 LAB
REF LAB TEST METHOD: NORMAL
REF LAB TEST METHOD: NORMAL

## 2023-02-06 NOTE — PROGRESS NOTES
[facility]  Gynecology  Date of Service: 2023    CC: ultrasound followup, EMB    HPI  Janell Suazo is a 42 y.o.  premenopausal female who presents with complaints of AUB, presenting for EMB, US followup.      Saw Dr. Palumbo previously 2021 for mastalgia. Saw me 23. Patient reports worsening periods since 2019. No cramping, regular monthly, last 7 days with couple days spotting before and after. States bleeding can be really heavy, filling large pad in 20 mins. States also with vaginal itching, SAMMI.      H/o fall and back pain. States throws back out usually once yearly.     H/o 3 term . IBS-mixed. Deals with some hemorrhoids.     States 3 surgeries previously for ovarian cysts: low transverse, vertical midline, lsc for last.    had LSO   has dx lsc.    had tubal.     2019 had EMB with endosee for menorrhagia and pelvic pain, benign.    States all women in family have had hysterectomies. Lots of mood issues in family with hormonal birth control makes her wary to try some of them.     19 pap NIL/HPV neg (due )     23 Hgb 13.5, Plt 293, TSH 2.880, A1c 5.8%, +BV, neg GC/CT/Trich/Hep C/Hep B/RPR/HIV    23 US: AV uterus 11.4 x 6.4 x 7.4 cm, heterogeneous myometrium without distinct lesion, ES 1.4 cm, ROV 4 x 4 x 4.3 cm, ROV cyst 3.8 cm, LOV not seen (surgically absent)    One of biggest concerns is vaginal itching. States is intermittent but more persistent over last month. No discharge. Not reporting any sexual dysfunction concerns.     ROS  Review of Systems   Constitutional: Negative.    Respiratory: Negative.    Gastrointestinal: Negative.    Genitourinary: Positive for menstrual problem.   Musculoskeletal: Positive for back pain.   Skin: Negative.    Psychiatric/Behavioral: Negative.        GYN HISTORY  Menarche: 11-12 yo  Menses: see above  History of STIs: not reported  Last pap smear:   Last Completed Pap Smear          PAP SMEAR  (Every 3 Years) Next due on 2023  LIQUID-BASED PAP SMEAR, [facility] ([facility])    2019  Liquid-based Pap Smear, Screening    2019  HPV DNA Probe, Direct - ThinPrep Vial, Cervix    10/28/2015  Converted (Historical) Gyn Cytology    10/28/2015  HPV DNA probe, direct    Only the first 5 history entries have been loaded, but more history exists.              Abnormal pap smear history: not reported  Contraception: tubal     OB HISTORY  OB History    Para Term  AB Living   3 3 3     3   SAB IAB Ectopic Molar Multiple Live Births             3      # Outcome Date GA Lbr Doug/2nd Weight Sex Delivery Anes PTL Lv   3 Term      Vag-Spont   CUCA   2 Term      Vag-Spont   CUCA   1 Term      Vag-Spont   CUCA     PAST MEDICAL HISTORY  Past Medical History:   Diagnosis Date   • Allergic rhinitis    • Anxiety state    • Diarrhea    • Epigastric pain     Burning epigastric pain    • Fatigue    • Gastroesophageal reflux disease    • Hip pain    • Knee pain    • Low back pain    • Lumbosacral strain    • Metabolic syndrome X    • Morbid obesity (HCC)     BMI 40.4    • MRSA (methicillin resistant Staphylococcus aureus) 2011    abscess on face   • Nausea and vomiting    • Obesity     Hyperinsulinar obesity      PAST SURGICAL HISTORY  Past Surgical History:   Procedure Laterality Date   • COLONOSCOPY N/A 2019    Procedure: COLONOSCOPY;  Surgeon: Gian Todd MD;  Location: [facility] ENDOSCOPY;  Service: Gastroenterology   • DIAGNOSTIC LAPAROSCOPY  2007    Exam under anesthesia, diagnostic laparoscopy, diagnostic hysteroscopy, fractional dilatation and curetttage   • ENDOSCOPY N/A 2019    Procedure: ESOPHAGOGASTRODUODENOSCOPY;  Surgeon: Gian Todd MD;  Location: [facility] ENDOSCOPY;  Service: Gastroenterology   • INJECTION OF MEDICATION  2012    Kenalog (1)  -  SILVESTRE Valdes   • PAP SMEAR  2011    Negative   • SALPINGO OOPHORECTOMY      Left   SAY Farris   •  "TUBAL ABDOMINAL LIGATION  2010    SAY Farris     FAMILY HISTORY  Family History   Problem Relation Age of Onset   • Colon cancer Other         Colorectal cancer   • Diabetes Other    • Hypertension Other    • Thyroid disease Other      SOCIAL HISTORY  Social History     Socioeconomic History   • Marital status: Single   Tobacco Use   • Smoking status: Never   • Smokeless tobacco: Never   Substance and Sexual Activity   • Alcohol use: No   • Drug use: No   • Sexual activity: Defer     ALLERGIES  No Known Allergies  HOME MEDICATIONS  Prior to Admission medications    Medication Sig Start Date End Date Taking? Authorizing Provider   aspirin-acetaminophen-caffeine (Excedrin Migraine) 250-250-65 MG per tablet Take 1 tablet by mouth Every 6 (Six) Hours As Needed for Headache. 6/18/21  Yes David Hinds MD   diclofenac (VOLTAREN) 50 MG EC tablet Take 1 tablet by mouth 3 (Three) Times a Day. 11/15/22  Yes Lina King APRN   ibuprofen (ADVIL,MOTRIN) 200 MG tablet Take 200 mg by mouth Every 6 (Six) Hours As Needed for Mild Pain .   Yes Provider, MD Meghan   pantoprazole (PROTONIX) 40 MG EC tablet Take 1 tablet by mouth Daily. 4/30/19  Yes Meche Barnes APRN   tiZANidine (ZANAFLEX) 2 MG tablet Take 1 tablet by mouth At Night As Needed for Muscle Spasms. 11/15/22  Yes Lina King APRN   fluconazole (Diflucan) 150 MG tablet Take one tablet by mouth now and one after completing treatment for bacterial vaginosis. 2/1/23   Angelita Tapia DO   metroNIDAZOLE (Flagyl) 500 MG tablet Take 1 tablet by mouth 2 (Two) Times a Day for 7 days. 1/30/23 2/6/23  Angelita Tapia DO     PE  /86 (BP Location: Left arm, Patient Position: Sitting, Cuff Size: Adult)   Ht 180.3 cm (71\")   Wt 136 kg (299 lb 6.4 oz)   LMP 01/11/2023   BMI 41.76 kg/m²        General: Alert, healthy, no distress, well nourished and well developed.  Neurologic: Alert, oriented to person, place, and time.  Gait normal.  Cranial nerves " II-XII grossly intact.  HEENT: Normocephalic, atraumatic.  Extraocular muscles intact.  Lungs: Normal respiratory effort.  Clear to auscultation bilaterally.  No wheezes, rhonci, or rales.  Heart: Regular rate and rhythm.  No murmer, rub or gallop.  Abdomen: Soft, non-tender, non-distended,no masses, no hepatosplenomegaly, no hernia.  Skin: No rash, no lesions.  Extremities: No cyanosis, clubbing or edema.  PELVIC EXAM:  External Genitalia/Vulva: Anatomy is normal, no significant redness of labia, no discharge on vulvar tissues, Funk's and Bartholin's glands are normal, no ulcers, no condylomatous lesions.  Urethral meatus: Normal, no lesions, no prolapse.  Urethra: Normal, no masses, no tenderness with palpation.  Bladder: Normal, no fullness, no masses, no tenderness with palpation.  Vagina: Vaginal tissues are not inflamed, normal color and texture, no significant discharge present.  Pelvic support adequate.  Cervix: Normal, no lesions, no purulent discharge, no cervical motion tenderness.  Uterus: Normal size, shape, and consistency.  Good mobility noted.  Minimal descent noted with good support.  Adnexa: Normal size and shape bilaterally, no palpable mass bilaterally and non-tender bilaterally.  Rectal:  JANETH deferred.    Endometrial Biopsy    Date/Time: 2/6/2023 9:54 AM  Performed by: Angelita Tapia DO  Authorized by: Angelita Tapia DO     Consent:     Consent obtained: verbal    Consent given by: patient    Risks discussed: bleeding, infection, need for repeat procedure and pain    Alternatives discussed: observation and alternative treatment    Patient agrees, verbalizes understanding, and wants to proceed: yes    Indications:     Indications: abnormal uterine bleeding    Procedure:     Prepped with: Betadine    Tenaculum used: yes      Cervix dilated: no      Number of passes: 1  Findings:     Cervix: normal      Specimen collected: specimen collected and sent to pathology      Patient tolerance: tolerated  well, no immediate complications  Comments:     Procedure comments: Endometrial biopsy was performed following verbal consent including risks of infection, bleeding, perforation, pain, need for additional procedures. Speculum was placed and cervix was adequately visualized. Cetacaine sprayed on cervix. Betadine was used to cleanse cervix x 3. Tenaculum was placed on anterior cervix. Pipelle was used to sound uterus to 11 cm and 1 pass was/were performed with moderate amount of blood tissue collected. The tenaculum was removed. Tenaculum sites were hemostatic and scant blood noted from cervical os. Speculum was removed. Patient tolerated procedure well.        IMPRESSION  Janell Suazo is a 42 y.o.  presenting for US followup and EMB for heavy menstrual bleeding.    PLAN    1. Abnormal uterine bleeding (AUB)  - US with enlarged heterogeneous uterus suggesting adenomyosis, possible fibroid change  - EMB today to rule out hyperplasia/atypia/malignancy, normal ES for menstruating woman  - Previously discussed medical or surgical management, patient not desiring surgery but wary of medication as states family with bad mental health side effects with birth control   - Desires to see what EMB shows then decide on plan. Gave pamphlet on IUD, considering Mirena, ablation, hysterectomy    > FU in 2 weeks to discuss IUD, medication, surgery?  - POC Pregnancy, Urine  - TISSUE EXAM, [facility] ([facility]); Future  - TISSUE EXAM, [facility] ([facility])    2. Cervical cancer screening  - LIQUID-BASED PAP SMEAR, [facility] ([facility]); Future  - LIQUID-BASED PAP SMEAR, [facility] ([facility])    3. Diabetes mellitus screening  - Prediabetes on workup  - Ambulatory Referral to Family Practice    4. Vulvar itching  - will try to treat for yeast  - if persistent consider vag panel/oneswab vs. Topical steroid or estrogen  - no concerning findings, normal skin color and texture, consider bx if persists                 This document has been electronically signed by Angelita Tapia DO on February 6, 2023 09:51 CST

## 2023-02-20 ENCOUNTER — TRANSCRIBE ORDERS (OUTPATIENT)
Dept: LAB | Facility: HOSPITAL | Age: 43
End: 2023-02-20
Payer: COMMERCIAL

## 2023-02-20 ENCOUNTER — LAB (OUTPATIENT)
Dept: LAB | Facility: HOSPITAL | Age: 43
End: 2023-02-20
Payer: COMMERCIAL

## 2023-02-20 ENCOUNTER — HOSPITAL ENCOUNTER (OUTPATIENT)
Dept: ULTRASOUND IMAGING | Facility: HOSPITAL | Age: 43
Discharge: HOME | End: 2023-02-20
Payer: COMMERCIAL

## 2023-02-20 DIAGNOSIS — R94.4 DECREASED RENAL CLEARANCE: ICD-10-CM

## 2023-02-20 DIAGNOSIS — R94.4 DECREASED RENAL CLEARANCE: Primary | ICD-10-CM

## 2023-02-20 DIAGNOSIS — N39.0 URINARY TRACT INFECTION WITHOUT HEMATURIA, SITE UNSPECIFIED: ICD-10-CM

## 2023-02-20 LAB
ALBUMIN SERPL-MCNC: 4.5 G/DL (ref 3.5–5.2)
ALBUMIN/GLOB SERPL: 1.5 G/DL
ALP SERPL-CCNC: 68 U/L (ref 39–117)
ALT SERPL W P-5'-P-CCNC: 28 U/L (ref 1–33)
ANION GAP SERPL CALCULATED.3IONS-SCNC: 7 MMOL/L (ref 5–15)
AST SERPL-CCNC: 22 U/L (ref 1–32)
BILIRUB SERPL-MCNC: 0.3 MG/DL (ref 0–1.2)
BUN SERPL-MCNC: 12 MG/DL (ref 6–20)
BUN/CREAT SERPL: 14 (ref 7–25)
CALCIUM SPEC-SCNC: 9.3 MG/DL (ref 8.6–10.5)
CHLORIDE SERPL-SCNC: 105 MMOL/L (ref 98–107)
CO2 SERPL-SCNC: 26 MMOL/L (ref 22–29)
CREAT SERPL-MCNC: 0.86 MG/DL (ref 0.57–1)
EGFRCR SERPLBLD CKD-EPI 2021: 86.6 ML/MIN/1.73
GLOBULIN UR ELPH-MCNC: 3 GM/DL
GLUCOSE SERPL-MCNC: 91 MG/DL (ref 65–99)
POTASSIUM SERPL-SCNC: 4 MMOL/L (ref 3.5–5.2)
PROT SERPL-MCNC: 7.5 G/DL (ref 6–8.5)
SODIUM SERPL-SCNC: 138 MMOL/L (ref 136–145)

## 2023-02-20 PROCEDURE — 76775 US EXAM ABDO BACK WALL LIM: CPT

## 2023-02-20 PROCEDURE — 80053 COMPREHEN METABOLIC PANEL: CPT

## 2023-02-20 PROCEDURE — 36415 COLL VENOUS BLD VENIPUNCTURE: CPT

## 2023-02-21 ENCOUNTER — OFFICE VISIT (OUTPATIENT)
Dept: OBSTETRICS AND GYNECOLOGY | Facility: CLINIC | Age: 43
End: 2023-02-21
Payer: COMMERCIAL

## 2023-02-21 VITALS
WEIGHT: 290 LBS | BODY MASS INDEX: 40.6 KG/M2 | SYSTOLIC BLOOD PRESSURE: 130 MMHG | DIASTOLIC BLOOD PRESSURE: 82 MMHG | HEIGHT: 71 IN

## 2023-02-21 DIAGNOSIS — N92.0 MENORRHAGIA WITH REGULAR CYCLE: ICD-10-CM

## 2023-02-21 DIAGNOSIS — N76.0 RECURRENT VAGINITIS: Primary | ICD-10-CM

## 2023-02-21 PROCEDURE — 99213 OFFICE O/P EST LOW 20 MIN: CPT | Performed by: STUDENT IN AN ORGANIZED HEALTH CARE EDUCATION/TRAINING PROGRAM

## 2023-03-03 ENCOUNTER — LAB (OUTPATIENT)
Dept: LAB | Facility: HOSPITAL | Age: 43
End: 2023-03-03
Payer: COMMERCIAL

## 2023-03-03 ENCOUNTER — OFFICE VISIT (OUTPATIENT)
Dept: FAMILY MEDICINE CLINIC | Facility: CLINIC | Age: 43
End: 2023-03-03
Payer: COMMERCIAL

## 2023-03-03 VITALS
HEIGHT: 71 IN | DIASTOLIC BLOOD PRESSURE: 84 MMHG | HEART RATE: 85 BPM | WEIGHT: 286 LBS | SYSTOLIC BLOOD PRESSURE: 132 MMHG | OXYGEN SATURATION: 99 % | BODY MASS INDEX: 40.04 KG/M2

## 2023-03-03 DIAGNOSIS — Z76.89 ENCOUNTER TO ESTABLISH CARE: ICD-10-CM

## 2023-03-03 DIAGNOSIS — Z13.220 SCREENING FOR HYPERCHOLESTEROLEMIA: ICD-10-CM

## 2023-03-03 DIAGNOSIS — Z13.29 SCREENING FOR THYROID DISORDER: ICD-10-CM

## 2023-03-03 DIAGNOSIS — L65.9 HAIR LOSS: ICD-10-CM

## 2023-03-03 DIAGNOSIS — E66.09 CLASS 2 OBESITY DUE TO EXCESS CALORIES WITHOUT SERIOUS COMORBIDITY WITH BODY MASS INDEX (BMI) OF 39.0 TO 39.9 IN ADULT: Primary | ICD-10-CM

## 2023-03-03 DIAGNOSIS — E66.09 CLASS 2 OBESITY DUE TO EXCESS CALORIES WITHOUT SERIOUS COMORBIDITY WITH BODY MASS INDEX (BMI) OF 39.0 TO 39.9 IN ADULT: ICD-10-CM

## 2023-03-03 DIAGNOSIS — Z12.11 SCREEN FOR COLON CANCER: ICD-10-CM

## 2023-03-03 PROCEDURE — 99214 OFFICE O/P EST MOD 30 MIN: CPT | Performed by: NURSE PRACTITIONER

## 2023-03-03 PROCEDURE — 82607 VITAMIN B-12: CPT

## 2023-03-03 PROCEDURE — 80061 LIPID PANEL: CPT

## 2023-03-03 PROCEDURE — 36415 COLL VENOUS BLD VENIPUNCTURE: CPT

## 2023-03-03 PROCEDURE — 84466 ASSAY OF TRANSFERRIN: CPT

## 2023-03-03 PROCEDURE — 80050 GENERAL HEALTH PANEL: CPT

## 2023-03-03 PROCEDURE — 83540 ASSAY OF IRON: CPT

## 2023-03-03 PROCEDURE — 82306 VITAMIN D 25 HYDROXY: CPT

## 2023-03-03 RX ORDER — TIZANIDINE 4 MG/1
4 TABLET ORAL NIGHTLY PRN
Qty: 30 TABLET | Refills: 2 | Status: SHIPPED | OUTPATIENT
Start: 2023-03-03

## 2023-03-03 RX ORDER — AMOXICILLIN AND CLAVULANATE POTASSIUM 500; 125 MG/1; MG/1
1 TABLET, FILM COATED ORAL EVERY 12 HOURS SCHEDULED
COMMUNITY
Start: 2023-02-27

## 2023-03-03 RX ORDER — FLUCONAZOLE 150 MG/1
TABLET ORAL
COMMUNITY
Start: 2023-02-27

## 2023-03-03 NOTE — PROGRESS NOTES
"Chief Complaint  Establish Care (New PT )    Subjective   Encounter to establish care.  Has obesity and has been dieting and increased walking over the last few months.   Has lost 14 pounds in the last several weeks. \"I have been eating better and trying to get more exercising in.\"  Over the past 2 years has been having increasing amounts of hair loss.  Onset of symptoms after being diagnosed with COVID 19.        Janell Suazo presents to [facility] [place]  History of Present Illness  Hair loss: Chronicity is chronic.  Onset more than a year ago.  Frequency is daily.  Progression since onset is gradually worsening.  Associated symptoms include fatigue.  Aggravating factors include history of COVID.  Obesity  This is a chronic problem. The current episode started more than 1 year ago. The problem occurs constantly. The problem has been gradually improving. The symptoms are aggravated by drinking and eating. Treatments tried: increased exercising and low calorie diet  The treatment provided moderate relief.       Current Outpatient Medications on File Prior to Visit   Medication Sig Dispense Refill   • amoxicillin-clavulanate (AUGMENTIN) 500-125 MG per tablet Take 1 tablet by mouth Every 12 (Twelve) Hours.     • aspirin-acetaminophen-caffeine (Excedrin Migraine) 250-250-65 MG per tablet Take 1 tablet by mouth Every 6 (Six) Hours As Needed for Headache. 30 tablet 0   • fluconazole (DIFLUCAN) 150 MG tablet TAKE 1 TABLET BY MOUTH EVERY 3 DAYS FOR 3 DOSES     • ibuprofen (ADVIL,MOTRIN) 200 MG tablet Take 1 tablet by mouth Every 6 (Six) Hours As Needed for Mild Pain.       No current facility-administered medications on file prior to visit.     No Known Allergies    Objective   Vital Signs:  /84   Pulse 85   Ht 180.3 cm (71\")   Wt 130 kg (286 lb)   SpO2 99%   BMI 39.89 kg/m²   Estimated body mass index is 39.89 kg/m² as calculated from the " "following:    Height as of this encounter: 180.3 cm (71\").    Weight as of this encounter: 130 kg (286 lb).         Physical Exam  Vitals and nursing note reviewed.   Constitutional:       Appearance: Normal appearance. She is well-developed. She is obese.   HENT:      Head: Normocephalic and atraumatic.      Right Ear: Tympanic membrane, ear canal and external ear normal.      Left Ear: Tympanic membrane, ear canal and external ear normal.      Nose: Nose normal.      Mouth/Throat:      Mouth: Mucous membranes are moist.      Pharynx: Oropharynx is clear.   Eyes:      Extraocular Movements: Extraocular movements intact.      Conjunctiva/sclera: Conjunctivae normal.      Pupils: Pupils are equal, round, and reactive to light.   Cardiovascular:      Rate and Rhythm: Normal rate and regular rhythm.      Pulses: Normal pulses.      Heart sounds: Normal heart sounds.   Pulmonary:      Effort: Pulmonary effort is normal.      Breath sounds: Normal breath sounds.   Abdominal:      General: Bowel sounds are normal.      Palpations: Abdomen is soft.   Musculoskeletal:         General: Normal range of motion.      Cervical back: Normal range of motion and neck supple.   Skin:     General: Skin is warm.      Capillary Refill: Capillary refill takes less than 2 seconds.   Neurological:      Mental Status: She is alert and oriented to person, place, and time.   Psychiatric:         Behavior: Behavior normal.        Result Review :                   Assessment and Plan   Diagnoses and all orders for this visit:    1. Class 2 obesity due to excess calories without serious comorbidity with body mass index (BMI) of 39.0 to 39.9 in adult (Primary)  -     CBC & Differential; Future  -     Comprehensive Metabolic Panel; Future    2. Hair loss  -     Iron Profile; Future  -     Vitamin B12; Future  -     Vitamin D,25-Hydroxy; Future    3. Screening for hypercholesterolemia  -     Lipid panel; Future    4. Screening for thyroid " disorder  -     TSH; Future    5. Screen for colon cancer  -     Ambulatory Referral For Screening Colonoscopy; Future    6. Encounter to establish care    Other orders  -     tiZANidine (ZANAFLEX) 4 MG tablet; Take 1 tablet by mouth At Night As Needed for Muscle Spasms.  Dispense: 30 tablet; Refill: 2      1.  Class II obesity due to excess calories without serious comorbidity with a body mass index of 39.0-39.9 in adult:  Body mass index is 39.89 kg/m².  Class 2 Severe Obesity (BMI >=35 and <=39.9). Obesity-related health conditions include the following: none. Obesity is improving with lifestyle modifications. BMI is is above average; BMI management plan is completed. We discussed portion control and increasing exercise.  Increase physical activity regimen to a minimum of 150 minutes/week    2.  Hair loss:  Complete iron profile, vitamin B12 and vitamin D level as ordered and notify of results when available  Begin collagen and biotin supplement, discussed hair, skin and nail Gummies    3.  Screening for hypercholesterolemia:  Complete fasting lipid panel as ordered and will notify of results when available    4.  Screening for thyroid disorder:  Complete TSH as ordered and will notify of results when available    5.  Screening for colon cancer:  Referral placed for screening colonoscopy and will notify her of results when available    6.  Encounter to establish care:         Follow Up   Return in about 6 years (around 3/3/2029).  Patient was given instructions and counseling regarding her condition or for health maintenance advice. Please see specific information pulled into the AVS if appropriate.         This document has been electronically signed by JAN Alvarez on March 6, 2023 08:59 CST

## 2023-03-04 LAB
25(OH)D3 SERPL-MCNC: 27.3 NG/ML (ref 30–100)
ALBUMIN SERPL-MCNC: 4.5 G/DL (ref 3.5–5.2)
ALBUMIN/GLOB SERPL: 1.6 G/DL
ALP SERPL-CCNC: 70 U/L (ref 39–117)
ALT SERPL W P-5'-P-CCNC: 23 U/L (ref 1–33)
ANION GAP SERPL CALCULATED.3IONS-SCNC: 9.6 MMOL/L (ref 5–15)
AST SERPL-CCNC: 18 U/L (ref 1–32)
BASOPHILS # BLD AUTO: 0.04 10*3/MM3 (ref 0–0.2)
BASOPHILS NFR BLD AUTO: 0.5 % (ref 0–1.5)
BILIRUB SERPL-MCNC: 0.3 MG/DL (ref 0–1.2)
BUN SERPL-MCNC: 13 MG/DL (ref 6–20)
BUN/CREAT SERPL: 16 (ref 7–25)
CALCIUM SPEC-SCNC: 9.4 MG/DL (ref 8.6–10.5)
CHLORIDE SERPL-SCNC: 104 MMOL/L (ref 98–107)
CHOLEST SERPL-MCNC: 157 MG/DL (ref 0–200)
CO2 SERPL-SCNC: 25.4 MMOL/L (ref 22–29)
CREAT SERPL-MCNC: 0.81 MG/DL (ref 0.57–1)
DEPRECATED RDW RBC AUTO: 37.4 FL (ref 37–54)
EGFRCR SERPLBLD CKD-EPI 2021: 93.1 ML/MIN/1.73
EOSINOPHIL # BLD AUTO: 0.16 10*3/MM3 (ref 0–0.4)
EOSINOPHIL NFR BLD AUTO: 2 % (ref 0.3–6.2)
ERYTHROCYTE [DISTWIDTH] IN BLOOD BY AUTOMATED COUNT: 12.5 % (ref 12.3–15.4)
GLOBULIN UR ELPH-MCNC: 2.8 GM/DL
GLUCOSE SERPL-MCNC: 89 MG/DL (ref 65–99)
HCT VFR BLD AUTO: 41.6 % (ref 34–46.6)
HDLC SERPL-MCNC: 41 MG/DL (ref 40–60)
HGB BLD-MCNC: 14 G/DL (ref 12–15.9)
IMM GRANULOCYTES # BLD AUTO: 0.02 10*3/MM3 (ref 0–0.05)
IMM GRANULOCYTES NFR BLD AUTO: 0.3 % (ref 0–0.5)
IRON 24H UR-MRATE: 48 MCG/DL (ref 37–145)
IRON SATN MFR SERPL: 10 % (ref 20–50)
LDLC SERPL CALC-MCNC: 99 MG/DL (ref 0–100)
LDLC/HDLC SERPL: 2.38 {RATIO}
LYMPHOCYTES # BLD AUTO: 2.15 10*3/MM3 (ref 0.7–3.1)
LYMPHOCYTES NFR BLD AUTO: 26.9 % (ref 19.6–45.3)
MCH RBC QN AUTO: 27.7 PG (ref 26.6–33)
MCHC RBC AUTO-ENTMCNC: 33.7 G/DL (ref 31.5–35.7)
MCV RBC AUTO: 82.4 FL (ref 79–97)
MONOCYTES # BLD AUTO: 0.75 10*3/MM3 (ref 0.1–0.9)
MONOCYTES NFR BLD AUTO: 9.4 % (ref 5–12)
NEUTROPHILS NFR BLD AUTO: 4.88 10*3/MM3 (ref 1.7–7)
NEUTROPHILS NFR BLD AUTO: 60.9 % (ref 42.7–76)
NRBC BLD AUTO-RTO: 0 /100 WBC (ref 0–0.2)
PLATELET # BLD AUTO: 276 10*3/MM3 (ref 140–450)
PMV BLD AUTO: 11.1 FL (ref 6–12)
POTASSIUM SERPL-SCNC: 4.1 MMOL/L (ref 3.5–5.2)
PROT SERPL-MCNC: 7.3 G/DL (ref 6–8.5)
RBC # BLD AUTO: 5.05 10*6/MM3 (ref 3.77–5.28)
SODIUM SERPL-SCNC: 139 MMOL/L (ref 136–145)
TIBC SERPL-MCNC: 489 MCG/DL (ref 298–536)
TRANSFERRIN SERPL-MCNC: 328 MG/DL (ref 200–360)
TRIGL SERPL-MCNC: 93 MG/DL (ref 0–150)
TSH SERPL DL<=0.05 MIU/L-ACNC: 2.61 UIU/ML (ref 0.27–4.2)
VIT B12 BLD-MCNC: 309 PG/ML (ref 211–946)
VLDLC SERPL-MCNC: 17 MG/DL (ref 5–40)
WBC NRBC COR # BLD: 8 10*3/MM3 (ref 3.4–10.8)

## 2023-03-06 DIAGNOSIS — E55.9 VITAMIN D INSUFFICIENCY: Primary | ICD-10-CM

## 2023-03-06 RX ORDER — ERGOCALCIFEROL 1.25 MG/1
50000 CAPSULE ORAL
Qty: 12 CAPSULE | Refills: 3 | Status: SHIPPED | OUTPATIENT
Start: 2023-03-06

## 2023-03-06 NOTE — PROGRESS NOTES
Per JAN Maurice, Ms. Suazo has been notified via [employer] with recent lab results & recommendations.  I did include my direct line number if she has any questions or concerns.     Continue current medications and follow-up as planned or sooner if any problems.

## 2023-03-10 DIAGNOSIS — E66.09 CLASS 2 OBESITY DUE TO EXCESS CALORIES WITHOUT SERIOUS COMORBIDITY WITH BODY MASS INDEX (BMI) OF 39.0 TO 39.9 IN ADULT: Primary | ICD-10-CM

## 2023-03-29 ENCOUNTER — TELEPHONE (OUTPATIENT)
Dept: FAMILY MEDICINE CLINIC | Facility: CLINIC | Age: 43
End: 2023-03-29
Payer: COMMERCIAL

## 2023-03-29 NOTE — TELEPHONE ENCOUNTER
Information sent to patient via [employer]      ----- Message from AJN Alvarez sent at 3/29/2023 12:52 PM CDT -----  Regarding: FW: Follow up  Contact: [phone]  It is not uncommon for hemorrhoids to bleed without rectal pressure especially if they are grade 2 or above.  Have her keep the appointment with JAN Mccartney and contact my office again if bleeding occurs.  If she starts to have any pain or any other changes she needs to seek emergency medical treatment.  ----- Message -----  From: Prema Black LPN  Sent: 3/29/2023  12:47 PM CDT  To: JAN Alvarez  Subject: FW: Follow up                                      ----- Message -----  From: Janell Suazo  Sent: 3/29/2023  11:23 AM CDT  To: [address] [facility] [place]  Subject: Follow up                                        Hey I have an apoinment with Meche Barnes. It won't let me message her. After my bowl movement today I had bright red blood. This has happened before. They told me last time I have hemroids.  I wasn't concerned because this does occasionally happen. However. I went to use the restroom just to pee and once again there was bright red blood as I wiped it is in the rectal area. That is a first because it's never happened without having a bowl movement. I'm sorry to bother you I am a little nervous and wasn't sure what to do. I'm not hurting. I don't feel bad. Nothing has changed that I know of.

## 2023-04-03 NOTE — PROGRESS NOTES
"[facility]  Gynecology  Date of Service: 2023    CC: GYN FU    HPI  Janell Suazo is a 43 y.o.y.o.  premenopausal female who presents for GYN FU.     Saw Dr. Palumbo previously 2021 for mastalgia. Saw me 23. Patient reported worsening periods since 2019. No cramping, regular monthly, last 7 days with couple days spotting before and after. States bleeding can be really heavy, filling large pad in 20 mins. States also with vaginal itching, SAMMI.      H/o fall and back pain. States throws back out usually once yearly.     H/o 3 term . IBS-mixed. Deals with some hemorrhoids.     States 3 surgeries previously for ovarian cysts: low transverse, vertical midline, lsc for last.    had LSO   has dx lsc.    had tubal.     2019 had EMB with endosee for menorrhagia and pelvic pain, benign.     States all women in family have had hysterectomies. Lots of mood issues in family with hormonal birth control makes her wary to try some of them.     19 pap NIL/HPV neg (due )     23 Hgb 13.5, Plt 293, TSH 2.880, A1c 5.8%, +BV, neg GC/CT/Trich/Hep C/Hep B/RPR/HIV     23 US: AV uterus 11.4 x 6.4 x 7.4 cm, heterogeneous myometrium without distinct lesion, ES 1.4 cm, ROV 4 x 4 x 4.3 cm, ROV cyst 3.8 cm, LOV not seen (surgically absent)     One of biggest concerns is vaginal itching. States is intermittent but more persistent over last month. No discharge. Not reporting any sexual dysfunction concerns.     23 pap NIL (needs repeat 3 years as no HPV)  23 EMB: Dyssynchronous endometrium (mixed proliferative and secretory phase   endometrium) Negative for hyperplasia, atypia, or malignancy     Patient states vagina still feels \"raw\". Itching more inside vagina. Tried to have intercourse and too painful. No bleeding after intercourse. Has urine culture pending from Urology, saw them for SAMMI. States bleeding stable and with normal bx declines other treatment " at this time, would like to monitor.     ROS  Review of Systems   Constitutional: Negative.    HENT: Negative.    Respiratory: Negative.    Cardiovascular: Negative.    Gastrointestinal: Negative.    Genitourinary: Positive for menstrual problem.        Vaginal itching   Skin: Negative.    Psychiatric/Behavioral: Negative.        GYN HISTORY  Menarche: 11-12 yo  Menses: see above  History of STIs: not reported  Last pap smear:   Last Completed Pap Smear          PAP SMEAR (Every 3 Years) Next due on 2023  LIQUID-BASED PAP SMEAR, [facility] ([facility])    2019  Liquid-based Pap Smear, Screening    2019  HPV DNA Probe, Direct - ThinPrep Vial, Cervix    10/28/2015  Converted (Historical) Gyn Cytology    10/28/2015  HPV DNA probe, direct    Only the first 5 history entries have been loaded, but more history exists.              Abnormal pap smear history: not reported  Contraception: tubal     OB HISTORY  OB History    Para Term  AB Living   3 3 3     3   SAB IAB Ectopic Molar Multiple Live Births             3      # Outcome Date GA Lbr Doug/2nd Weight Sex Delivery Anes PTL Lv   3 Term      Vag-Spont   CUCA   2 Term      Vag-Spont   CUCA   1 Term      Vag-Spont   CUCA     PAST MEDICAL HISTORY  Past Medical History:   Diagnosis Date   • Allergic rhinitis    • Anxiety state    • Diarrhea    • Epigastric pain     Burning epigastric pain    • Fatigue    • Gastroesophageal reflux disease    • Hip pain    • Knee pain    • Low back pain    • Lumbosacral strain    • Metabolic syndrome X    • Morbid obesity     BMI 40.4    • MRSA (methicillin resistant Staphylococcus aureus)     abscess on face   • Nausea and vomiting    • Obesity     Hyperinsulinar obesity      PAST SURGICAL HISTORY  Past Surgical History:   Procedure Laterality Date   • COLONOSCOPY N/A 2019    Procedure: COLONOSCOPY;  Surgeon: Gian Todd MD;  Location: [facility] ENDOSCOPY;  Service: Gastroenterology   •  DIAGNOSTIC LAPAROSCOPY  06/21/2007    Exam under anesthesia, diagnostic laparoscopy, diagnostic hysteroscopy, fractional dilatation and curetttage   • ENDOSCOPY N/A 4/17/2019    Procedure: ESOPHAGOGASTRODUODENOSCOPY;  Surgeon: Gian Todd MD;  Location: [facility] ENDOSCOPY;  Service: Gastroenterology   • INJECTION OF MEDICATION  11/05/2012    Kenalog (1)  -  SILVESTRE Valdes   • PAP SMEAR  08/08/2011    Negative   • SALPINGO OOPHORECTOMY  2004    Left   [place], [place]   • TUBAL ABDOMINAL LIGATION  2010    [place]     FAMILY HISTORY  Family History   Problem Relation Age of Onset   • Colon cancer Other         Colorectal cancer   • Diabetes Other    • Hypertension Other    • Thyroid disease Other      SOCIAL HISTORY  Social History     Socioeconomic History   • Marital status: Single   Tobacco Use   • Smoking status: Never   • Smokeless tobacco: Never   Vaping Use   • Vaping Use: Never used   Substance and Sexual Activity   • Alcohol use: No   • Drug use: No   • Sexual activity: Defer     ALLERGIES  No Known Allergies  HOME MEDICATIONS  Prior to Admission medications    Medication Sig Start Date End Date Taking? Authorizing Provider   aspirin-acetaminophen-caffeine (Excedrin Migraine) 250-250-65 MG per tablet Take 1 tablet by mouth Every 6 (Six) Hours As Needed for Headache. 6/18/21  Yes David Hinds MD   ibuprofen (ADVIL,MOTRIN) 200 MG tablet Take 1 tablet by mouth Every 6 (Six) Hours As Needed for Mild Pain.   Yes ProviderMeghan MD   amoxicillin-clavulanate (AUGMENTIN) 500-125 MG per tablet Take 1 tablet by mouth Every 12 (Twelve) Hours. 2/27/23   Meghan Slade MD   diclofenac (VOLTAREN) 50 MG EC tablet Take 1 tablet by mouth 2 (Two) Times a Day As Needed (Pain). 3/6/23   Kaylene Tapia APRN   fluconazole (DIFLUCAN) 150 MG tablet TAKE 1 TABLET BY MOUTH EVERY 3 DAYS FOR 3 DOSES 2/27/23   Meghan Slade MD   tiZANidine (ZANAFLEX) 4 MG tablet Take 1 tablet by mouth At Night As Needed  "for Muscle Spasms. 3/3/23   Kaylene Tapia APRN   vitamin D (ERGOCALCIFEROL) 1.25 MG (00307 UT) capsule capsule Take 1 capsule by mouth Every 7 (Seven) Days. 3/6/23   Kaylene Tapia APRN     PE  /82   Ht 180.3 cm (71\")   Wt 132 kg (290 lb)   BMI 40.45 kg/m²        General: Alert, healthy, no distress, well nourished and well developed.  Neurologic: Alert, oriented to person, place, and time.  Gait normal.  Cranial nerves II-XII grossly intact.  HEENT: Normocephalic, atraumatic.  Extraocular muscles intact.  Lungs: Normal respiratory effort.   Abdomen: Soft, non-tender, non-distended,no masses, no hepatosplenomegaly, no hernia.  Skin: No rash, no lesions.  Extremities: No cyanosis, clubbing or edema.  PELVIC EXAM:    IMPRESSION  Janell Suazo is a 43 y.o.  presenting with heavy menstrual bleeding, with normal EMB and US, with concern for persistent vaginal itching/discomfort.    PLAN    1. Recurrent vaginitis  - OneSwab - Kit, Cervix, Vagina; Future  - If normal, consider vaginal cream for BV +/- vaginal estrogen  - On prior exam no concerning findings, normal skin color and texture, consider bx if persists     2. Menorrhagia with regular cycle  - Declines hormonal or other treatment at this time               This document has been electronically signed by Angelita Tapia DO on 2023 21:36 CDT  "

## 2023-04-17 ENCOUNTER — OFFICE VISIT (OUTPATIENT)
Dept: GASTROENTEROLOGY | Facility: CLINIC | Age: 43
End: 2023-04-17
Payer: COMMERCIAL

## 2023-04-17 VITALS
SYSTOLIC BLOOD PRESSURE: 122 MMHG | HEIGHT: 71 IN | DIASTOLIC BLOOD PRESSURE: 64 MMHG | HEART RATE: 78 BPM | WEIGHT: 279.8 LBS | BODY MASS INDEX: 39.17 KG/M2

## 2023-04-17 DIAGNOSIS — Z86.010 ENCOUNTER FOR COLONOSCOPY DUE TO HISTORY OF ADENOMATOUS COLONIC POLYPS: Primary | ICD-10-CM

## 2023-04-17 DIAGNOSIS — R85.7 ABNORMAL SMALL BOWEL BIOPSY: ICD-10-CM

## 2023-04-17 DIAGNOSIS — R10.13 EPIGASTRIC PAIN: ICD-10-CM

## 2023-04-17 DIAGNOSIS — Z12.11 ENCOUNTER FOR COLONOSCOPY DUE TO HISTORY OF ADENOMATOUS COLONIC POLYPS: Primary | ICD-10-CM

## 2023-04-17 RX ORDER — DEXTROSE AND SODIUM CHLORIDE 5; .45 G/100ML; G/100ML
30 INJECTION, SOLUTION INTRAVENOUS CONTINUOUS PRN
Status: CANCELLED | OUTPATIENT
Start: 2023-04-17

## 2023-04-17 RX ORDER — SODIUM, POTASSIUM,MAG SULFATES 17.5-3.13G
1 SOLUTION, RECONSTITUTED, ORAL ORAL EVERY 12 HOURS
Qty: 354 ML | Refills: 0 | Status: SHIPPED | OUTPATIENT
Start: 2023-04-17

## 2023-04-17 RX ORDER — SODIUM CHLORIDE 9 MG/ML
40 INJECTION, SOLUTION INTRAVENOUS AS NEEDED
Status: CANCELLED | OUTPATIENT
Start: 2023-04-17

## 2023-04-17 NOTE — PROGRESS NOTES
Chief Complaint   Patient presents with   • Colon Cancer Screening     recall       Subjective    Janell Suazo is a 43 y.o. female. she is here today for follow-up.                                                                  Assessment & Plan                                     1. Encounter for colonoscopy due to history of adenomatous colonic polyps    2. Epigastric pain    3. Abnormal small bowel biopsy      Plan; schedule patient for screening colonoscopy due to history of adenomatous colonic polyp of transverse colon.   EGD with duodenal biopsy due to epigastric pain and abnormal duodenal biopsy on previous EGD    Follow-up: Return in about 4 weeks (around 5/15/2023) for Recheck, After test.     HPI  43-year-old female presents to discuss screening colonoscopy due to recall letter.  Denies any abdominal pain or GI complaints at this time.  Reports she has been following high-protein diet for weight loss and has lost over 20 pounds.  Has noted some constipation but is doing well with increasing activity water and fiber.  States habits vary based on intake.  But since she has made dietary modifications after visit in 2019 overall abdominal pain and nausea has stopped.  She denies any melena hematochezia or known family history of colorectal cancer.  Lab work was completed per PCP 3/6/2023 and was stable.  Late entry patient contacted office by phone and has had more epigastric pain and would like to plan EGD     Review of Systems  Review of Systems   Constitutional: Positive for fatigue. Negative for activity change, appetite change, chills, diaphoresis, fever and unexpected weight change.   HENT: Negative for sore throat and trouble swallowing.    Respiratory: Negative for shortness of breath.    Gastrointestinal: Positive for abdominal pain (much better since modifying diet  "epigastric pain witth certain intake) and constipation (due to high protein diet ). Negative for abdominal distention, anal bleeding, blood in stool, diarrhea, nausea, rectal pain and vomiting.   Musculoskeletal: Negative for arthralgias.   Skin: Negative for pallor.   Neurological: Negative for light-headedness.       /64 (BP Location: Left arm)   Pulse 78   Ht 180.3 cm (71\")   Wt 127 kg (279 lb 12.8 oz)   BMI 39.02 kg/m²     Objective      Physical Exam  Constitutional:       General: She is not in acute distress.     Appearance: Normal appearance. She is obese. She is not ill-appearing.   HENT:      Head: Normocephalic and atraumatic.   Pulmonary:      Effort: Pulmonary effort is normal.   Abdominal:      General: Abdomen is flat. Bowel sounds are normal. There is no distension.      Palpations: Abdomen is soft. There is no mass.      Tenderness: There is no abdominal tenderness.   Neurological:      Mental Status: She is alert.               The following portions of the patient's history were reviewed and updated as appropriate:   Past Medical History:   Diagnosis Date   • Allergic rhinitis    • Anxiety state    • Diarrhea    • Epigastric pain     Burning epigastric pain    • Fatigue    • Gastroesophageal reflux disease    • Hip pain    • Knee pain    • Low back pain    • Lumbosacral strain    • Metabolic syndrome X    • Morbid obesity     BMI 40.4    • MRSA (methicillin resistant Staphylococcus aureus) 2011    abscess on face   • Nausea and vomiting    • Obesity     Hyperinsulinar obesity      Past Surgical History:   Procedure Laterality Date   • COLONOSCOPY N/A 4/17/2019    Procedure: COLONOSCOPY;  Surgeon: Gian Todd MD;  Location: [facility] ENDOSCOPY;  Service: Gastroenterology   • DIAGNOSTIC LAPAROSCOPY  06/21/2007    Exam under anesthesia, diagnostic laparoscopy, diagnostic hysteroscopy, fractional dilatation and curetttage   • ENDOSCOPY N/A 4/17/2019    Procedure: " ESOPHAGOGASTRODUODENOSCOPY;  Surgeon: Gian Todd MD;  Location: [facility] ENDOSCOPY;  Service: Gastroenterology   • INJECTION OF MEDICATION  2012    Kenalog (1)  -  SILVESTRE Valdes   • PAP SMEAR  2011    Negative   • SALPINGO OOPHORECTOMY      Left   [place]   • TUBAL ABDOMINAL LIGATION      [place]     Family History   Problem Relation Age of Onset   • Colon cancer Other         Colorectal cancer   • Diabetes Other    • Hypertension Other    • Thyroid disease Other      OB History        3    Para   3    Term   3            AB        Living   3       SAB        IAB        Ectopic        Molar        Multiple        Live Births   3              No Known Allergies  Social History     Socioeconomic History   • Marital status: Single   Tobacco Use   • Smoking status: Never   • Smokeless tobacco: Never   Vaping Use   • Vaping Use: Never used   Substance and Sexual Activity   • Alcohol use: No   • Drug use: No   • Sexual activity: Defer     Current Medications:  Prior to Admission medications    Medication Sig Start Date End Date Taking? Authorizing Provider   aspirin-acetaminophen-caffeine (Excedrin Migraine) 250-250-65 MG per tablet Take 1 tablet by mouth Every 6 (Six) Hours As Needed for Headache. 21  Yes David Hinds MD   diclofenac (VOLTAREN) 50 MG EC tablet Take 1 tablet by mouth 2 (Two) Times a Day As Needed (Pain). 3/6/23  Yes Kaylene Tapia APRN   ibuprofen (ADVIL,MOTRIN) 200 MG tablet Take 1 tablet by mouth Every 6 (Six) Hours As Needed for Mild Pain.   Yes Provider, MD Meghan   tiZANidine (ZANAFLEX) 4 MG tablet Take 1 tablet by mouth At Night As Needed for Muscle Spasms. 3/3/23  Yes Kaylene Tapia APRN   vitamin D (ERGOCALCIFEROL) 1.25 MG (82183 UT) capsule capsule Take 1 capsule by mouth Every 7 (Seven) Days. 3/6/23  Yes Kaylene Tapia APRN   fluconazole (DIFLUCAN) 150 MG tablet TAKE 1 TABLET BY MOUTH EVERY 3 DAYS FOR 3 DOSES  Patient not taking:  Reported on 4/17/2023 2/27/23   Meghan Slade MD   amoxicillin-clavulanate (AUGMENTIN) 500-125 MG per tablet Take 1 tablet by mouth Every 12 (Twelve) Hours. 2/27/23 4/17/23  Meghan Slade MD     Orders placed during this encounter include:  Orders Placed This Encounter   Procedures   • Obtain Informed Consent     Standing Status:   Future     Order Specific Question:   Informed Consent Given For     Answer:   COLONOSCOPY     COLONOSCOPY (N/A)  New Medications Ordered This Visit   Medications   • sodium-potassium-magnesium sulfates (Suprep Bowel Prep Kit) 17.5-3.13-1.6 GM/177ML solution oral solution     Sig: Take 1 bottle by mouth Every 12 (Twelve) Hours.     Dispense:  354 mL     Refill:  0         Review and/or summary of lab tests, radiology, procedures, medications. Review and summary of old records and obtaining of history. The risks and benefits of my recommendations, as well as other treatment options were discussed . Any questions/concerned were answered. Patient voiced understanding and agreement.          This document has been electronically signed by JAN Gonzalez on April 21, 2023 10:53 CDT                                               Results for orders placed or performed in visit on 03/03/23   CBC Auto Differential    Specimen: Blood   Result Value Ref Range    WBC 8.00 3.40 - 10.80 10*3/mm3    RBC 5.05 3.77 - 5.28 10*6/mm3    Hemoglobin 14.0 12.0 - 15.9 g/dL    Hematocrit 41.6 34.0 - 46.6 %    MCV 82.4 79.0 - 97.0 fL    MCH 27.7 26.6 - 33.0 pg    MCHC 33.7 31.5 - 35.7 g/dL    RDW 12.5 12.3 - 15.4 %    RDW-SD 37.4 37.0 - 54.0 fl    MPV 11.1 6.0 - 12.0 fL    Platelets 276 140 - 450 10*3/mm3    Neutrophil % 60.9 42.7 - 76.0 %    Lymphocyte % 26.9 19.6 - 45.3 %    Monocyte % 9.4 5.0 - 12.0 %    Eosinophil % 2.0 0.3 - 6.2 %    Basophil % 0.5 0.0 - 1.5 %    Immature Grans % 0.3 0.0 - 0.5 %    Neutrophils, Absolute 4.88 1.70 - 7.00 10*3/mm3    Lymphocytes, Absolute 2.15 0.70 - 3.10  10*3/mm3    Monocytes, Absolute 0.75 0.10 - 0.90 10*3/mm3    Eosinophils, Absolute 0.16 0.00 - 0.40 10*3/mm3    Basophils, Absolute 0.04 0.00 - 0.20 10*3/mm3    Immature Grans, Absolute 0.02 0.00 - 0.05 10*3/mm3    nRBC 0.0 0.0 - 0.2 /100 WBC   Iron Profile    Specimen: Blood   Result Value Ref Range    Iron 48 37 - 145 mcg/dL    Iron Saturation 10 (L) 20 - 50 %    Transferrin 328 200 - 360 mg/dL    TIBC 489 298 - 536 mcg/dL   Vitamin D,25-Hydroxy    Specimen: Blood   Result Value Ref Range    25 Hydroxy, Vitamin D 27.3 (L) 30.0 - 100.0 ng/ml   TSH    Specimen: Blood   Result Value Ref Range    TSH 2.610 0.270 - 4.200 uIU/mL   Vitamin B12    Specimen: Blood   Result Value Ref Range    Vitamin B-12 309 211 - 946 pg/mL   Lipid panel    Specimen: Blood   Result Value Ref Range    Total Cholesterol 157 0 - 200 mg/dL    Triglycerides 93 0 - 150 mg/dL    HDL Cholesterol 41 40 - 60 mg/dL    LDL Cholesterol  99 0 - 100 mg/dL    VLDL Cholesterol 17 5 - 40 mg/dL    LDL/HDL Ratio 2.38    Comprehensive Metabolic Panel    Specimen: Blood   Result Value Ref Range    Glucose 89 65 - 99 mg/dL    BUN 13 6 - 20 mg/dL    Creatinine 0.81 0.57 - 1.00 mg/dL    Sodium 139 136 - 145 mmol/L    Potassium 4.1 3.5 - 5.2 mmol/L    Chloride 104 98 - 107 mmol/L    CO2 25.4 22.0 - 29.0 mmol/L    Calcium 9.4 8.6 - 10.5 mg/dL    Total Protein 7.3 6.0 - 8.5 g/dL    Albumin 4.5 3.5 - 5.2 g/dL    ALT (SGPT) 23 1 - 33 U/L    AST (SGOT) 18 1 - 32 U/L    Alkaline Phosphatase 70 39 - 117 U/L    Total Bilirubin 0.3 0.0 - 1.2 mg/dL    Globulin 2.8 gm/dL    A/G Ratio 1.6 g/dL    BUN/Creatinine Ratio 16.0 7.0 - 25.0    Anion Gap 9.6 5.0 - 15.0 mmol/L    eGFR 93.1 >60.0 mL/min/1.73   Results for orders placed or performed in visit on 02/20/23   Comprehensive Metabolic Panel    Specimen: Blood   Result Value Ref Range    Glucose 91 65 - 99 mg/dL    BUN 12 6 - 20 mg/dL    Creatinine 0.86 0.57 - 1.00 mg/dL    Sodium 138 136 - 145 mmol/L    Potassium 4.0 3.5 - 5.2  mmol/L    Chloride 105 98 - 107 mmol/L    CO2 26.0 22.0 - 29.0 mmol/L    Calcium 9.3 8.6 - 10.5 mg/dL    Total Protein 7.5 6.0 - 8.5 g/dL    Albumin 4.5 3.5 - 5.2 g/dL    ALT (SGPT) 28 1 - 33 U/L    AST (SGOT) 22 1 - 32 U/L    Alkaline Phosphatase 68 39 - 117 U/L    Total Bilirubin 0.3 0.0 - 1.2 mg/dL    Globulin 3.0 gm/dL    A/G Ratio 1.5 g/dL    BUN/Creatinine Ratio 14.0 7.0 - 25.0    Anion Gap 7.0 5.0 - 15.0 mmol/L    eGFR 86.6 >60.0 mL/min/1.73   Results for orders placed or performed in visit on 23   LIQUID-BASED PAP SMEAR, [facility] ([facility])    Specimen: Cervix; ThinPrep Vial   Result Value Ref Range    Reference Lab Report       [facility]  [address] [place] [address]  Phone: [phone] or [phone]  Fax: [phone]  Lavon Douglass M.D., Medical Director    PATIENT NAME                                     LABORATORY NO.  [address] CHUN.                     [id number]  [id number]                                 AGE                    SEX   SSN              CLIENT REF #  [facility]                42        1980      F     [id number]      [id number]    [facility]                               REQUESTING M.D.           ATTENDING MJarrodD.         COPY TO[address] STEINBERG, [facility] , 2ND FLOOR                  DATE COLLECTED            DATE RECEIVED          [place] [address]                     2023  ThinPrep Pap with [facility]    DIAGNOSIS:  Negative for intraepithelial lesion or  malignancy    Multiple factors can influence accuracy of Pap tests; therefore, screening at  regular intervals is necessary for early cancer detection.    COMMENT:     Benign cellular changes associated with inflammation are present.    SPECIMEN ADEQUACY:  SATISFACTORY FOR EVALUATION  Transformation zone is  "present.  SOURCE OF SPECIMEN:       CERVICAL  SLIDES:  1  CLINICAL HISTORY:  Cervical cancer screening    CYTOTECHNOLOGIST:             NAS HERNANDEZ (ASCP)    CPT CODES:  99804     TISSUE EXAM, [facility] ([facility])    Specimen: Tissue   Result Value Ref Range    Reference Lab Report       [facility]  [address] [place] [address]  Phone: [phone] or [phone]  Fax: [phone]  Lavon Douglass M.D., Medical Director    PATIENT NAME                           LABORATORY NO.  [address] CHUN.           [id number]  [id number]                         AGE              SEX  SSN           CLIENT REF #  [place] [id number]      1980  F    [id number]   [id number]    [facility]                       REQUESTING M.D.     ATTENDING M.D.     COPY TO[address] STEINBERG, [facility] 1, 2ND FLOOR          DATE COLLECTED      DATE RECEIVED      [place] [address]             2023    DIAGNOSIS:  ENDOMETRIUM, BIOPSY:  Dyssynchronous endometrium (mixed proliferative and secretory phase  endometrium)  Negative for hyperplasia, atypia, or malignancy    SILKE    CLINICAL HISTORY:  Abnormal uterine  bleeding    SPECIMENS RECEIVED:  ENDOMETRIUM, BIOPSY    MICROSCOPIC DESCRIPTION:  Tissue blocks are prepared and slides are examined microscopically on all  specimens. See diagnosis for details.    Professional interpretation rendered by Heriberto Garza M.D., F.C.A.P. at [facility]  [employer], [facility], [address] [place] [address].    GROSS DESCRIPTION:  Specimen is received in 1 formalin filled container listed as \"endometrial  biopsy\" per the requisition sheet and consists of multiple tan-brown soft  hemorrhagic tissue fragments admixed with mucus measuring 2.5 x 1.7 x 0.3 cm  in aggregate.  Specimen is filtered and submitted entirely in 1 cassette.  [facility]    REVIEWED, " DIAGNOSED AND ELECTRONICALLY  SIGNED BY:    Heriberto Garza M.D., VIBHA  CPT CODES:  74010     POC Pregnancy, Urine    Specimen: Urine   Result Value Ref Range    HCG, Urine, QL Negative Negative    Lot Number [id number]     Internal Positive Control Passed Positive, Passed    Internal Negative Control Passed Negative, Passed    Expiration Date 03/31/2024    Results for orders placed or performed in visit on 01/11/23   Urinalysis, Microscopic Only - Urine, Clean Catch    Specimen: Urine, Clean Catch   Result Value Ref Range    RBC, UA 0-2 None Seen, 0-2 /HPF    WBC, UA 6-12 (A) None Seen, 0-2 /HPF    Bacteria, UA None Seen None Seen /HPF    Squamous Epithelial Cells, UA 3-6 (A) None Seen, 0-2 /HPF    Hyaline Casts, UA 0-2 None Seen /LPF    Methodology Automated Microscopy    Urinalysis With Culture If Indicated - Urine, Clean Catch    Specimen: Urine, Clean Catch   Result Value Ref Range    Color, UA Yellow Yellow, Straw    Appearance, UA Clear Clear    pH, UA 5.5 5.0 - 8.0    Specific Gravity, UA 1.022 1.005 - 1.030    Glucose,  mg/dL (Trace) (A) Negative    Ketones, UA Negative Negative    Bilirubin, UA Negative Negative    Blood, UA Small (1+) (A) Negative    Protein, UA Trace (A) Negative    Leuk Esterase, UA Trace (A) Negative    Nitrite, UA Negative Negative    Urobilinogen, UA 0.2 E.U./dL 0.2 - 1.0 E.U./dL     *Note: Due to a large number of results and/or encounters for the requested time period, some results have not been displayed. A complete set of results can be found in Results Review.

## 2023-04-18 ENCOUNTER — HOSPITAL ENCOUNTER (OUTPATIENT)
Dept: NUTRITION | Facility: HOSPITAL | Age: 43
Discharge: HOME | End: 2023-04-18
Payer: COMMERCIAL

## 2023-04-18 NOTE — CONSULTS
Adult Outpatient Nutrition  Assessment/PES    Patient Name:  Janell Suazo  YOB: 1980  MRN: [id number]    Assessment Date:  4/18/2023    Comments:  Pt referred to nutrition services by MD for weight management. Pt seen today alone. Per notes pt has been experiencing increased constipation and has low iron. Her most recent A1c of 5.8 shows she is pre-diabetic. Pt states she has been making changes to her lifestyle since January to improve overall health and lose weight. She does not want to take medication for diabetes/health problems. Her final end goal weight is 215#. Changes she has made include: giving up Dr. Pepper, counting calories, following a low carbohydrate diet, increasing exercise (walking and weight lifting while watching tv at home.), and keeping a food diary on her phone. She has had some success in wt loss (01/2023 310# to # for about 30# wt loss). 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 Pt feels like she doesn't know what dietary changes to make and presents to RDN staff requesting education on healthy food choices. Encouragement was provided about the changes she has already made and the victories she has already achieved. We then discussed the recommended amount of carbohydrates she should be consuming daily and calorie requirements. Recommended CHO intake approximately 220 grams per day (45-55 grams per meal) and 9835-3153 kcal per day. The diabetic exchange list was provided to aid in understanding of CHO content in food choices and portion sizes. Portion sizes and CHO content of many of her favorite foods were located on the exchange list and discussed. A sample menu and other educational handouts for incorporating more fruits/vegetables were provided to provide an example of what a day of food may look like.     Goals:  1. Eat 45-55 g CHO per meal (220 grams per day).   2. Continue exercising - walking 5 days per week,  lifting weights 3 days per week.  3. Work on becoming familiar with portion sizes by using measuring cups or food scales.  4. Recognize and praise self for non-scale victories.    Pt to follow up in one week to assess progress and review material covered today.     General Info     Row Name 04/18/23 1715       Today's Session    Person(s) attending today's session Patient (P)      Services Used Today? No (P)                  Retired Nutritional Info/Activity     Row Name 04/18/23 1715 04/18/23 1714       Physical Activity    Are you currently involved in an activity/exercise program?  -- Yes (P)     Describe physical activity -- Walking, lifting weights/doing body weight exercise while watching tv (P)     How many minutes do you spend on exercise each day? -- 30 (P)        Nutrition/Diet History    Supplemental Drinks/Foods/Additives Drinks Atkins protein shakes for breakfast. (P)  --               Home Nutrition Report     Row Name 04/18/23 1715          Home Nutrition Report    Fluid/Beverage Intake Liquid meal replacements used (P)      Diet No specific (P)      Typical Intake (Food/Fluid/EN/PN) Pt generally eats three meals and one to two snacks per day. Currently tries to limit carbohydrates and eat high protein foods. (P)      Supplemental Drinks/Foods/Additives Drinks Atkins protein shakes for breakfast. (P)                 Estimated/Assessed Needs - Anthropometrics     Row Name 04/18/23 1720          Anthropometrics    Weight for Calculation 84.4 kg (186 lb) (P)         Estimated/Assessed Needs    Additional Documentation Protein Requirements (Group);Fluid Requirements (Group);KCAL/KG (Group) (P)         KCAL/KG    KCAL/KG 20 Kcal/Kg (kcal);18 Kcal/Kg (kcal) (P)      18 Kcal/Kg (kcal) 1518.642 (P)      20 Kcal/Kg (kcal) 1687.38 (P)         Protein Requirements    Weight Used For Protein Calculations 84.4 kg (186 lb) (P)      Est Protein Requirement Amount (gms/kg) 0.8 gm protein (P)      Estimated  Protein Requirements (gms/day) 67.5 (P)         Fluid Requirements    Fluid Requirements (mL/day) 2200 (P)      Estimated Fluid Requirement Method RDA Method (P)      RDA Method (mL) 2200 (P)                 Labs/Tests/Procedures/Meds     Row Name 04/18/23 7792          Labs/Procedures/Meds    Lab Results Reviewed reviewed, pertinent (P)      Lab Results Comments A1c 5.8 (P)         Medications    Pertinent Medications Reviewed reviewed, pertinent (P)      Pertinent Medications Comments Vitamin D, iron once per week (P)                    Problem/Interventions:                      Electronically signed by:  Adrienne Flores  04/18/23 17:24 CDT

## 2023-04-21 PROBLEM — R10.13 EPIGASTRIC PAIN: Status: ACTIVE | Noted: 2023-04-21

## 2023-04-21 PROBLEM — R85.7 ABNORMAL SMALL BOWEL BIOPSY: Status: ACTIVE | Noted: 2023-04-21

## 2023-04-21 RX ORDER — DEXTROSE AND SODIUM CHLORIDE 5; .45 G/100ML; G/100ML
30 INJECTION, SOLUTION INTRAVENOUS CONTINUOUS PRN
OUTPATIENT
Start: 2023-04-21

## 2023-04-21 RX ORDER — SODIUM CHLORIDE 9 MG/ML
40 INJECTION, SOLUTION INTRAVENOUS AS NEEDED
OUTPATIENT
Start: 2023-04-21

## 2023-04-21 NOTE — ADDENDUM NOTE
Addended by: MAXIMINO BOOKER on: 4/21/2023 10:55 AM     Modules accepted: Orders, Level of Service, SmartSet

## 2023-04-26 ENCOUNTER — HOSPITAL ENCOUNTER (OUTPATIENT)
Dept: NUTRITION | Facility: HOSPITAL | Age: 43
Discharge: HOME | End: 2023-04-26
Payer: COMMERCIAL

## 2023-04-26 PROCEDURE — 97803 MED NUTRITION INDIV SUBSEQ: CPT | Performed by: DIETITIAN, REGISTERED

## 2023-04-26 NOTE — PROGRESS NOTES
"Adult Outpatient Nutrition  Assessment/PES    Patient Name:  Janell Suazo  YOB: 1980  MRN: [id number]    Assessment Date:  4/26/2023    Comments:  Pt returned for a follow up visit. She said she had good news-she had lost 2 more lbs. She is learning and trying to find healthy carbs to eat and stay within the 45 grams per meal. She is pleased with her progress. Appears to feel better /more energetic than on the last visit. She had questions about foods that were answered to her satisfaction.  Her weight is 274 lb now. She is walking every day. She feels the 1600 calories is working well .  \"i am a work in progress\"   Pt would like to follow up in 4-5 weeks for more education.  This RDN will follow then.     General Info     Row Name 04/26/23 1636       Today's Session    Person(s) attending today's session Patient               Physical Findings     Row Name 04/26/23 1636          Physical Findings    Overall Physical Appearance overweight                Retired Nutritional Info/Activity     Row Name 04/26/23 1636          Eating Environment    Eating environment Family        Physical Activity    Are you currently involved in an activity/exercise program?  Yes     Describe physical activity walking every day                Home Nutrition Report     Row Name 04/26/23 1636          Home Nutrition Report    Typical Intake (Food/Fluid/EN/PN) 3 meals per day with intermittent fastin from 6-7 pm  until 8-9 am.     Food Preferences pt is happy to be eating more carbohydrates.                  Labs/Tests/Procedures/Meds     Row Name 04/26/23 1638          Labs/Procedures/Meds    Lab Results Comments no new labs                   Problem/Interventions:   Problem 1     Row Name 04/26/23 1641          Nutrition Diagnoses Problem 1    Problem 1 Knowledge Deficit     Etiology (related to) Medical Diagnosis     Signs/Symptoms (evidenced by) Demonstrated Information Deficit                        " Intervention Goal     Row Name 04/26/23 1641          Intervention Goal    General Provide information regarding MNT for treatment/condition     Weight Appropriate weight loss                    Education/Evaluation     Row Name 04/26/23 1641          Education    Education Provided education regarding;Education topics     Education Topics Basic nutrition;CHO counting;Gradual weight loss        Monitor/Evaluation    Monitor Per protocol     Education Follow-up Reinforce PRN                 Electronically signed by:  Prema Vicente RD  04/26/23 16:43 CDT

## 2023-06-08 ENCOUNTER — TELEPHONE (OUTPATIENT)
Dept: FAMILY MEDICINE CLINIC | Facility: CLINIC | Age: 43
End: 2023-06-08
Payer: COMMERCIAL

## 2023-06-08 NOTE — TELEPHONE ENCOUNTER
Janell left a message saying she has been playing phone tag with someone.   Her number is [phone]

## 2023-06-09 ENCOUNTER — ANESTHESIA EVENT (OUTPATIENT)
Dept: GASTROENTEROLOGY | Facility: HOSPITAL | Age: 43
End: 2023-06-09
Payer: COMMERCIAL

## 2023-06-09 ENCOUNTER — HOSPITAL ENCOUNTER (OUTPATIENT)
Facility: HOSPITAL | Age: 43
Setting detail: HOSPITAL OUTPATIENT SURGERY
Discharge: HOME | End: 2023-06-09
Attending: INTERNAL MEDICINE | Admitting: INTERNAL MEDICINE
Payer: COMMERCIAL

## 2023-06-09 ENCOUNTER — ANESTHESIA (OUTPATIENT)
Dept: GASTROENTEROLOGY | Facility: HOSPITAL | Age: 43
End: 2023-06-09
Payer: COMMERCIAL

## 2023-06-09 VITALS
HEART RATE: 68 BPM | BODY MASS INDEX: 35.7 KG/M2 | RESPIRATION RATE: 20 BRPM | TEMPERATURE: 97 F | OXYGEN SATURATION: 98 % | DIASTOLIC BLOOD PRESSURE: 52 MMHG | WEIGHT: 255 LBS | HEIGHT: 71 IN | SYSTOLIC BLOOD PRESSURE: 101 MMHG

## 2023-06-09 DIAGNOSIS — R85.7 ABNORMAL SMALL BOWEL BIOPSY: ICD-10-CM

## 2023-06-09 DIAGNOSIS — R10.13 EPIGASTRIC PAIN: ICD-10-CM

## 2023-06-09 DIAGNOSIS — Z86.010 ENCOUNTER FOR COLONOSCOPY DUE TO HISTORY OF ADENOMATOUS COLONIC POLYPS: ICD-10-CM

## 2023-06-09 DIAGNOSIS — Z12.11 ENCOUNTER FOR COLONOSCOPY DUE TO HISTORY OF ADENOMATOUS COLONIC POLYPS: ICD-10-CM

## 2023-06-09 PROCEDURE — 25010000002 PROPOFOL 10 MG/ML EMULSION: Performed by: NURSE ANESTHETIST, CERTIFIED REGISTERED

## 2023-06-09 PROCEDURE — 45378 DIAGNOSTIC COLONOSCOPY: CPT | Performed by: INTERNAL MEDICINE

## 2023-06-09 PROCEDURE — 43239 EGD BIOPSY SINGLE/MULTIPLE: CPT | Performed by: INTERNAL MEDICINE

## 2023-06-09 RX ORDER — DEXTROSE AND SODIUM CHLORIDE 5; .45 G/100ML; G/100ML
30 INJECTION, SOLUTION INTRAVENOUS CONTINUOUS PRN
Status: DISCONTINUED | OUTPATIENT
Start: 2023-06-09 | End: 2023-06-09 | Stop reason: HOSPADM

## 2023-06-09 RX ORDER — LIDOCAINE HYDROCHLORIDE 20 MG/ML
INJECTION, SOLUTION INTRAVENOUS AS NEEDED
Status: DISCONTINUED | OUTPATIENT
Start: 2023-06-09 | End: 2023-06-09 | Stop reason: SURG

## 2023-06-09 RX ORDER — PROPOFOL 10 MG/ML
VIAL (ML) INTRAVENOUS AS NEEDED
Status: DISCONTINUED | OUTPATIENT
Start: 2023-06-09 | End: 2023-06-09 | Stop reason: SURG

## 2023-06-09 RX ADMIN — PROPOFOL 20 MG: 10 INJECTION, EMULSION INTRAVENOUS at 11:25

## 2023-06-09 RX ADMIN — PROPOFOL 10 MG: 10 INJECTION, EMULSION INTRAVENOUS at 11:28

## 2023-06-09 RX ADMIN — DEXTROSE AND SODIUM CHLORIDE 30 ML/HR: 5; 450 INJECTION, SOLUTION INTRAVENOUS at 10:20

## 2023-06-09 RX ADMIN — LIDOCAINE HYDROCHLORIDE 100 MG: 20 INJECTION, SOLUTION INTRAVENOUS at 11:22

## 2023-06-09 RX ADMIN — PROPOFOL 20 MG: 10 INJECTION, EMULSION INTRAVENOUS at 11:30

## 2023-06-09 RX ADMIN — PROPOFOL 100 MG: 10 INJECTION, EMULSION INTRAVENOUS at 11:22

## 2023-06-09 RX ADMIN — PROPOFOL 50 MG: 10 INJECTION, EMULSION INTRAVENOUS at 11:23

## 2023-06-09 RX ADMIN — PROPOFOL 60 MG: 10 INJECTION, EMULSION INTRAVENOUS at 11:24

## 2023-06-09 RX ADMIN — PROPOFOL 30 MG: 10 INJECTION, EMULSION INTRAVENOUS at 11:27

## 2023-06-09 RX ADMIN — PROPOFOL 10 MG: 10 INJECTION, EMULSION INTRAVENOUS at 11:31

## 2023-06-09 NOTE — ANESTHESIA PREPROCEDURE EVALUATION
Anesthesia Evaluation     Patient summary reviewed and Nursing notes reviewed   NPO Solid Status: > 8 hours  NPO Liquid Status: > 8 hours           Airway   Mallampati: II  TM distance: >3 FB  Neck ROM: full  Dental - normal exam     Pulmonary    Cardiovascular         Neuro/Psych  (+) psychiatric history Anxiety  GI/Hepatic/Renal/Endo    (+) obesity, GERD, GI bleeding lower resolved    Musculoskeletal     Abdominal    Substance History      OB/GYN          Other                      Anesthesia Plan    ASA 2     general   total IV anesthesia  intravenous induction     Anesthetic plan, risks, benefits, and alternatives have been provided, discussed and informed consent has been obtained with: patient.    CODE STATUS:

## 2023-06-09 NOTE — H&P
No chief complaint on file.      Subjective    Janell Suazo is a 43 y.o. female. she is here today for follow-up.                                                                  Assessment & Plan                                     1. Epigastric pain    2. Abnormal small bowel biopsy    3. Encounter for colonoscopy due to history of adenomatous colonic polyps      Plan; schedule patient for screening colonoscopy due to history of adenomatous colonic polyp of transverse colon.   EGD with duodenal biopsy due to epigastric pain and abnormal duodenal biopsy on previous EGD    Follow-up: No follow-ups on file.     HPI  43-year-old female presents to discuss screening colonoscopy due to recall letter.  Denies any abdominal pain or GI complaints at this time.  Reports she has been following high-protein diet for weight loss and has lost over 20 pounds.  Has noted some constipation but is doing well with increasing activity water and fiber.  States habits vary based on intake.  But since she has made dietary modifications after visit in 2019 overall abdominal pain and nausea has stopped.  She denies any melena hematochezia or known family history of colorectal cancer.  Lab work was completed per PCP 3/6/2023 and was stable.  Late entry patient contacted office by phone and has had more epigastric pain and would like to plan EGD     Review of Systems  Review of Systems   Constitutional:  Positive for fatigue. Negative for activity change, appetite change, chills, diaphoresis, fever and unexpected weight change.   HENT:  Negative for sore throat and trouble swallowing.    Respiratory:  Negative for shortness of breath.    Gastrointestinal:  Positive for abdominal pain (much better since modifying diet epigastric pain witth certain intake) and constipation (due to high protein diet ). Negative for  abdominal distention, anal bleeding, blood in stool, diarrhea, nausea, rectal pain and vomiting.   Musculoskeletal:  Negative for arthralgias.   Skin:  Negative for pallor.   Neurological:  Negative for light-headedness.     There were no vitals taken for this visit.    Objective      Physical Exam  Constitutional:       General: She is not in acute distress.     Appearance: Normal appearance. She is obese. She is not ill-appearing.   HENT:      Head: Normocephalic and atraumatic.   Pulmonary:      Effort: Pulmonary effort is normal.   Abdominal:      General: Abdomen is flat. Bowel sounds are normal. There is no distension.      Palpations: Abdomen is soft. There is no mass.      Tenderness: There is no abdominal tenderness.   Neurological:      Mental Status: She is alert.             The following portions of the patient's history were reviewed and updated as appropriate:   Past Medical History:   Diagnosis Date    Allergic rhinitis     Anxiety state     Diarrhea     Epigastric pain     Burning epigastric pain     Fatigue     Gastroesophageal reflux disease     Hip pain     Knee pain     Low back pain     Lumbosacral strain     Metabolic syndrome X     Morbid obesity     BMI 40.4     MRSA (methicillin resistant Staphylococcus aureus) 2011    abscess on face    Nausea and vomiting     Obesity     Hyperinsulinar obesity      Past Surgical History:   Procedure Laterality Date    COLONOSCOPY N/A 4/17/2019    Procedure: COLONOSCOPY;  Surgeon: Gian Todd MD;  Location: [facility] ENDOSCOPY;  Service: Gastroenterology    DIAGNOSTIC LAPAROSCOPY  06/21/2007    Exam under anesthesia, diagnostic laparoscopy, diagnostic hysteroscopy, fractional dilatation and curetttage    ENDOSCOPY N/A 4/17/2019    Procedure: ESOPHAGOGASTRODUODENOSCOPY;  Surgeon: Gian Todd MD;  Location: [facility] ENDOSCOPY;  Service: Gastroenterology    INJECTION OF MEDICATION  11/05/2012    Kenalog (1)  Bill Valdes    PAP SMEAR  08/08/2011     Negative    SALPINGO OOPHORECTOMY  2004    Left   [place]    TUBAL ABDOMINAL LIGATION      SAY [place]     Family History   Problem Relation Age of Onset    Colon cancer Other         Colorectal cancer    Diabetes Other     Hypertension Other     Thyroid disease Other      OB History          3    Para   3    Term   3            AB        Living   3         SAB        IAB        Ectopic        Molar        Multiple        Live Births   3              No Known Allergies  Social History     Socioeconomic History    Marital status: Single   Tobacco Use    Smoking status: Never    Smokeless tobacco: Never   Vaping Use    Vaping Use: Never used   Substance and Sexual Activity    Alcohol use: No    Drug use: No    Sexual activity: Defer     Current Medications:  Prior to Admission medications    Medication Sig Start Date End Date Taking? Authorizing Provider   aspirin-acetaminophen-caffeine (Excedrin Migraine) 250-250-65 MG per tablet Take 1 tablet by mouth Every 6 (Six) Hours As Needed for Headache. 21  Yes David Hinds MD   diclofenac (VOLTAREN) 50 MG EC tablet Take 1 tablet by mouth 2 (Two) Times a Day As Needed (Pain). 3/6/23  Yes Kaylene Tapia APRN   ibuprofen (ADVIL,MOTRIN) 200 MG tablet Take 1 tablet by mouth Every 6 (Six) Hours As Needed for Mild Pain.   Yes Meghan Slade MD   tiZANidine (ZANAFLEX) 4 MG tablet Take 1 tablet by mouth At Night As Needed for Muscle Spasms. 3/3/23  Yes Kaylene Tapia APRN   vitamin D (ERGOCALCIFEROL) 1.25 MG (01580 UT) capsule capsule Take 1 capsule by mouth Every 7 (Seven) Days. 3/6/23  Yes Kaylene Tapia APRN   fluconazole (DIFLUCAN) 150 MG tablet TAKE 1 TABLET BY MOUTH EVERY 3 DAYS FOR 3 DOSES  Patient not taking: Reported on 2023   Meghan Slade MD   amoxicillin-clavulanate (AUGMENTIN) 500-125 MG per tablet Take 1 tablet by mouth Every 12 (Twelve) Hours. 23  Meghan Slade MD     Orders  placed during this encounter include:  Orders Placed This Encounter   Procedures    Obtain Informed Consent     Standing Status:   Standing     Number of Occurrences:   1     Order Specific Question:   Informed Consent Given For     Answer:   ESOPHAGOGASTRODUODENOSCOPY and Colonoscopy    POC Glucose Once     Prior to Procedure on ALL Diabetic Patients     Standing Status:   Standing     Number of Occurrences:   1     Order Specific Question:   Release to patient     Answer:   Routine Release    Insert Peripheral IV     Standing Status:   Standing     Number of Occurrences:   1     ESOPHAGOGASTRODUODENOSCOPY (N/A), COLONOSCOPY (N/A)  New Medications Ordered This Visit   Medications    dextrose 5 % and sodium chloride 0.45 % infusion         Review and/or summary of lab tests, radiology, procedures, medications. Review and summary of old records and obtaining of history. The risks and benefits of my recommendations, as well as other treatment options were discussed . Any questions/concerned were answered. Patient voiced understanding and agreement.          This document has been electronically signed by Gian Todd MD on June 9, 2023 09:56 CDT                                               Results for orders placed or performed in visit on 03/03/23   CBC Auto Differential    Specimen: Blood   Result Value Ref Range    WBC 8.00 3.40 - 10.80 10*3/mm3    RBC 5.05 3.77 - 5.28 10*6/mm3    Hemoglobin 14.0 12.0 - 15.9 g/dL    Hematocrit 41.6 34.0 - 46.6 %    MCV 82.4 79.0 - 97.0 fL    MCH 27.7 26.6 - 33.0 pg    MCHC 33.7 31.5 - 35.7 g/dL    RDW 12.5 12.3 - 15.4 %    RDW-SD 37.4 37.0 - 54.0 fl    MPV 11.1 6.0 - 12.0 fL    Platelets 276 140 - 450 10*3/mm3    Neutrophil % 60.9 42.7 - 76.0 %    Lymphocyte % 26.9 19.6 - 45.3 %    Monocyte % 9.4 5.0 - 12.0 %    Eosinophil % 2.0 0.3 - 6.2 %    Basophil % 0.5 0.0 - 1.5 %    Immature Grans % 0.3 0.0 - 0.5 %    Neutrophils, Absolute 4.88 1.70 - 7.00 10*3/mm3    Lymphocytes,  Absolute 2.15 0.70 - 3.10 10*3/mm3    Monocytes, Absolute 0.75 0.10 - 0.90 10*3/mm3    Eosinophils, Absolute 0.16 0.00 - 0.40 10*3/mm3    Basophils, Absolute 0.04 0.00 - 0.20 10*3/mm3    Immature Grans, Absolute 0.02 0.00 - 0.05 10*3/mm3    nRBC 0.0 0.0 - 0.2 /100 WBC   Iron Profile    Specimen: Blood   Result Value Ref Range    Iron 48 37 - 145 mcg/dL    Iron Saturation (TSAT) 10 (L) 20 - 50 %    Transferrin 328 200 - 360 mg/dL    TIBC 489 298 - 536 mcg/dL   Vitamin D,25-Hydroxy    Specimen: Blood   Result Value Ref Range    25 Hydroxy, Vitamin D 27.3 (L) 30.0 - 100.0 ng/ml   TSH    Specimen: Blood   Result Value Ref Range    TSH 2.610 0.270 - 4.200 uIU/mL   Vitamin B12    Specimen: Blood   Result Value Ref Range    Vitamin B-12 309 211 - 946 pg/mL   Lipid panel    Specimen: Blood   Result Value Ref Range    Total Cholesterol 157 0 - 200 mg/dL    Triglycerides 93 0 - 150 mg/dL    HDL Cholesterol 41 40 - 60 mg/dL    LDL Cholesterol  99 0 - 100 mg/dL    VLDL Cholesterol 17 5 - 40 mg/dL    LDL/HDL Ratio 2.38    Comprehensive Metabolic Panel    Specimen: Blood   Result Value Ref Range    Glucose 89 65 - 99 mg/dL    BUN 13 6 - 20 mg/dL    Creatinine 0.81 0.57 - 1.00 mg/dL    Sodium 139 136 - 145 mmol/L    Potassium 4.1 3.5 - 5.2 mmol/L    Chloride 104 98 - 107 mmol/L    CO2 25.4 22.0 - 29.0 mmol/L    Calcium 9.4 8.6 - 10.5 mg/dL    Total Protein 7.3 6.0 - 8.5 g/dL    Albumin 4.5 3.5 - 5.2 g/dL    ALT (SGPT) 23 1 - 33 U/L    AST (SGOT) 18 1 - 32 U/L    Alkaline Phosphatase 70 39 - 117 U/L    Total Bilirubin 0.3 0.0 - 1.2 mg/dL    Globulin 2.8 gm/dL    A/G Ratio 1.6 g/dL    BUN/Creatinine Ratio 16.0 7.0 - 25.0    Anion Gap 9.6 5.0 - 15.0 mmol/L    eGFR 93.1 >60.0 mL/min/1.73   Results for orders placed or performed in visit on 02/20/23   Comprehensive Metabolic Panel    Specimen: Blood   Result Value Ref Range    Glucose 91 65 - 99 mg/dL    BUN 12 6 - 20 mg/dL    Creatinine 0.86 0.57 - 1.00 mg/dL    Sodium 138 136 - 145  mmol/L    Potassium 4.0 3.5 - 5.2 mmol/L    Chloride 105 98 - 107 mmol/L    CO2 26.0 22.0 - 29.0 mmol/L    Calcium 9.3 8.6 - 10.5 mg/dL    Total Protein 7.5 6.0 - 8.5 g/dL    Albumin 4.5 3.5 - 5.2 g/dL    ALT (SGPT) 28 1 - 33 U/L    AST (SGOT) 22 1 - 32 U/L    Alkaline Phosphatase 68 39 - 117 U/L    Total Bilirubin 0.3 0.0 - 1.2 mg/dL    Globulin 3.0 gm/dL    A/G Ratio 1.5 g/dL    BUN/Creatinine Ratio 14.0 7.0 - 25.0    Anion Gap 7.0 5.0 - 15.0 mmol/L    eGFR 86.6 >60.0 mL/min/1.73   Results for orders placed or performed in visit on 23   LIQUID-BASED PAP SMEAR, [facility] ([facility])    Specimen: Cervix; ThinPrep Vial   Result Value Ref Range    Reference Lab Report       [facility]  [address] [place] [address]  Phone: [phone] or [phone]  Fax: [phone]  Lavon Douglass M.D., Medical Director    PATIENT NAME                                     LABORATORY NO.  [id number]   RANDY BOOGIE.                     [id number]  [id number]                                 AGE                    SEX   N              CLIENT REF #  [facility]                42        1980      F     [id number]      [id number]    [facility]                               REQUESTING M.D.           ATTENDING M.D.         [facility] [facility] [facility]                  DATE COLLECTED            DATE RECEIVED          [place] [address]                     2023  ThinPrep Pap with [facility]    DIAGNOSIS:  Negative for intraepithelial lesion or  malignancy    Multiple factors can influence accuracy of Pap tests; therefore, screening at  regular intervals is necessary for early cancer detection.    COMMENT:     Benign cellular changes associated with inflammation are present.    SPECIMEN ADEQUACY:  SATISFACTORY FOR  "EVALUATION  Transformation zone is present.  SOURCE OF SPECIMEN:       CERVICAL  SLIDES:  1  CLINICAL HISTORY:  Cervical cancer screening    CYTOTECHNOLOGIST:             NAS HERNANDEZ (ASCP)    CPT CODES:  66603     TISSUE EXAM, [facility] ([facility])    Specimen: Tissue   Result Value Ref Range    Reference Lab Report       [facility]  [address] [place] [address]  Phone: [phone] or [phone]  Fax: [phone]  Lavon Douglass M.D., Medical Director    PATIENT NAME                           LABORATORY NO.  [id number]  RANDY BOOGIE.           [id number]  [id number]                         AGE              SEX  N           CLIENT REF #  [place] [address]      1980  F    [id number]   [id number]    [facility]                       REQUESTING M.D.     ATTENDING M.D.     COPY TO[address] 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 See diagnosis for details.    Professional interpretation rendered by Heriberto Garza M.D., F.C.A.P. at [facility], [facility], [address] [place] [address].    GROSS DESCRIPTION:  Specimen is received in 1 formalin filled container listed as \"endometrial  biopsy\" per the requisition sheet and consists of multiple tan-brown soft  hemorrhagic tissue fragments admixed with mucus measuring 2.5 x 1.7 x 0.3 cm  in aggregate.  Specimen is filtered and submitted entirely " in 1 cassette.  M[facility]    REVIEWED, DIAGNOSED AND ELECTRONICALLY  SIGNED BY:    Heriberto Garza M.D., VIBHA  CPT CODES:  65679     POC Pregnancy, Urine    Specimen: Urine   Result Value Ref Range    HCG, Urine, QL Negative Negative    Lot Number [id number]     Internal Positive Control Passed Positive, Passed    Internal Negative Control Passed Negative, Passed    Expiration Date 03/31/2024    Results for orders placed or performed in visit on 01/11/23   Urinalysis, Microscopic Only - Urine, Clean Catch    Specimen: Urine, Clean Catch   Result Value Ref Range    RBC, UA 0-2 None Seen, 0-2 /HPF    WBC, UA 6-12 (A) None Seen, 0-2 /HPF    Bacteria, UA None Seen None Seen /HPF    Squamous Epithelial Cells, UA 3-6 (A) None Seen, 0-2 /HPF    Hyaline Casts, UA 0-2 None Seen /LPF    Methodology Automated Microscopy    Urinalysis With Culture If Indicated - Urine, Clean Catch    Specimen: Urine, Clean Catch   Result Value Ref Range    Color, UA Yellow Yellow, Straw    Appearance, UA Clear Clear    pH, UA 5.5 5.0 - 8.0    Specific Gravity, UA 1.022 1.005 - 1.030    Glucose,  mg/dL (Trace) (A) Negative    Ketones, UA Negative Negative    Bilirubin, UA Negative Negative    Blood, UA Small (1+) (A) Negative    Protein, UA Trace (A) Negative    Leuk Esterase, UA Trace (A) Negative    Nitrite, UA Negative Negative    Urobilinogen, UA 0.2 E.U./dL 0.2 - 1.0 E.U./dL     *Note: Due to a large number of results and/or encounters for the requested time period, some results have not been displayed. A complete set of results can be found in Results Review.

## 2023-06-09 NOTE — ANESTHESIA POSTPROCEDURE EVALUATION
Patient: Janell Suazo    Procedure Summary       Date: 06/09/23 Room / Location: [facility] ENDOSCOPY 1 / [facility] ENDOSCOPY    Anesthesia Start: 1122 Anesthesia Stop: 1135    Procedures:       ESOPHAGOGASTRODUODENOSCOPY      COLONOSCOPY Diagnosis:       Encounter for colonoscopy due to history of adenomatous colonic polyps      Epigastric pain      Abnormal small bowel biopsy      (Encounter for colonoscopy due to history of adenomatous colonic polyps [Z12.11, Z86.010])      (Epigastric pain [R10.13])      (Abnormal small bowel biopsy [R85.7])    Surgeons: Gian Todd MD Provider: Lukas Gaspar CRNA    Anesthesia Type: general ASA Status: 2            Anesthesia Type: general    Vitals  No vitals data found for the desired time range.          Post Anesthesia Care and Evaluation    Patient location during evaluation: PHASE II  Patient participation: waiting for patient participation  Level of consciousness: sleepy but conscious  Pain management: adequate    Airway patency: patent  Anesthetic complications: No anesthetic complications  PONV Status: none  Cardiovascular status: acceptable  Respiratory status: acceptable, spontaneous ventilation and room air  Hydration status: acceptable    Comments: BP- 106/57  HR- 60  O2 sat- 98%  Temp- 98.0  Resp- 20

## 2023-06-14 LAB — REF LAB TEST METHOD: NORMAL

## 2023-08-04 ENCOUNTER — LAB (OUTPATIENT)
Dept: LAB | Facility: HOSPITAL | Age: 43
End: 2023-08-04
Payer: COMMERCIAL

## 2023-08-04 ENCOUNTER — TELEPHONE (OUTPATIENT)
Dept: OBSTETRICS AND GYNECOLOGY | Facility: CLINIC | Age: 43
End: 2023-08-04

## 2023-08-04 DIAGNOSIS — Z20.2 POSSIBLE EXPOSURE TO STD: ICD-10-CM

## 2023-08-04 DIAGNOSIS — Z20.2 POSSIBLE EXPOSURE TO STD: Primary | ICD-10-CM

## 2023-08-04 LAB
HBV SURFACE AG SERPL QL IA: NORMAL
HCV AB SER DONR QL: NORMAL
HIV 1+2 AB+HIV1 P24 AG SERPL QL IA: NORMAL
RPR SER QL: NORMAL

## 2023-08-04 PROCEDURE — 86803 HEPATITIS C AB TEST: CPT | Performed by: STUDENT IN AN ORGANIZED HEALTH CARE EDUCATION/TRAINING PROGRAM

## 2023-08-04 PROCEDURE — 87340 HEPATITIS B SURFACE AG IA: CPT | Performed by: STUDENT IN AN ORGANIZED HEALTH CARE EDUCATION/TRAINING PROGRAM

## 2023-08-04 PROCEDURE — 87491 CHLMYD TRACH DNA AMP PROBE: CPT

## 2023-08-04 PROCEDURE — 87661 TRICHOMONAS VAGINALIS AMPLIF: CPT

## 2023-08-04 PROCEDURE — 87591 N.GONORRHOEAE DNA AMP PROB: CPT

## 2023-08-04 PROCEDURE — 36415 COLL VENOUS BLD VENIPUNCTURE: CPT | Performed by: STUDENT IN AN ORGANIZED HEALTH CARE EDUCATION/TRAINING PROGRAM

## 2023-08-04 PROCEDURE — G0432 EIA HIV-1/HIV-2 SCREEN: HCPCS | Performed by: STUDENT IN AN ORGANIZED HEALTH CARE EDUCATION/TRAINING PROGRAM

## 2023-08-04 PROCEDURE — 86592 SYPHILIS TEST NON-TREP QUAL: CPT | Performed by: STUDENT IN AN ORGANIZED HEALTH CARE EDUCATION/TRAINING PROGRAM

## 2023-08-04 NOTE — TELEPHONE ENCOUNTER
Called and spoke with patient. Lab order were put in. Patient voiced an understanding and was encouraged to call with any further questions or concerns.

## 2023-08-04 NOTE — TELEPHONE ENCOUNTER
PT requested labs for sti and a swab. PT requested a returned call to the number on file once lab orders are in.

## 2023-08-05 LAB
C TRACH RRNA CVX QL NAA+PROBE: NEGATIVE
N GONORRHOEA RRNA SPEC QL NAA+PROBE: NEGATIVE
TRICHOMONAS VAGINALIS PCR: NEGATIVE

## 2023-08-31 PROCEDURE — 0352U HC INF DIS BACTERIAL VAGINOSIS AND VAGINITIS AMPLIFIED PROBE TECHNIQUE: CPT | Performed by: NURSE PRACTITIONER

## 2023-08-31 PROCEDURE — 87661 TRICHOMONAS VAGINALIS AMPLIF: CPT | Performed by: NURSE PRACTITIONER

## 2023-08-31 PROCEDURE — 87591 N.GONORRHOEAE DNA AMP PROB: CPT | Performed by: NURSE PRACTITIONER

## 2023-08-31 PROCEDURE — 87086 URINE CULTURE/COLONY COUNT: CPT | Performed by: NURSE PRACTITIONER

## 2023-08-31 PROCEDURE — 87491 CHLMYD TRACH DNA AMP PROBE: CPT | Performed by: NURSE PRACTITIONER

## (undated) DEVICE — SINGLE-USE BIOPSY FORCEPS: Brand: RADIAL JAW 4

## (undated) DEVICE — CANN SMPL SOFTECH BIFLO ETCO2 A/M 7FT

## (undated) DEVICE — BITEBLOCK ENDO W/STRAP 60F A/ LF DISP